# Patient Record
Sex: FEMALE | Race: WHITE | NOT HISPANIC OR LATINO | Employment: OTHER | URBAN - METROPOLITAN AREA
[De-identification: names, ages, dates, MRNs, and addresses within clinical notes are randomized per-mention and may not be internally consistent; named-entity substitution may affect disease eponyms.]

---

## 2018-01-01 ENCOUNTER — ANESTHESIA (OUTPATIENT)
Dept: PERIOP | Facility: HOSPITAL | Age: 73
End: 2018-01-01
Payer: COMMERCIAL

## 2018-01-01 ENCOUNTER — HOSPITAL ENCOUNTER (OUTPATIENT)
Dept: INFUSION CENTER | Facility: HOSPITAL | Age: 73
Discharge: HOME | End: 2018-10-04
Payer: COMMERCIAL

## 2018-01-01 ENCOUNTER — HOSPITAL ENCOUNTER (OUTPATIENT)
Dept: RADIOLOGY | Facility: HOSPITAL | Age: 73
Discharge: HOME | End: 2018-06-27
Attending: INTERNAL MEDICINE
Payer: COMMERCIAL

## 2018-01-01 ENCOUNTER — HOSPITAL ENCOUNTER (OUTPATIENT)
Dept: NON INVASIVE DIAGNOSTICS | Facility: HOSPITAL | Age: 73
Discharge: HOME | End: 2018-08-28
Attending: INTERNAL MEDICINE
Payer: COMMERCIAL

## 2018-01-01 ENCOUNTER — HOSPITAL ENCOUNTER (OUTPATIENT)
Dept: NON INVASIVE DIAGNOSTICS | Facility: HOSPITAL | Age: 73
Discharge: HOME | End: 2018-09-21
Attending: INTERNAL MEDICINE
Payer: COMMERCIAL

## 2018-01-01 ENCOUNTER — TRANSCRIBE ORDERS (OUTPATIENT)
Dept: ADMINISTRATIVE | Facility: HOSPITAL | Age: 73
End: 2018-01-01

## 2018-01-01 ENCOUNTER — HOSPITAL ENCOUNTER (OUTPATIENT)
Dept: RADIOLOGY | Facility: HOSPITAL | Age: 73
Discharge: HOME | End: 2018-07-06
Attending: OBSTETRICS & GYNECOLOGY
Payer: COMMERCIAL

## 2018-01-01 ENCOUNTER — TELEPHONE (OUTPATIENT)
Dept: HEMATOLOGY ONCOLOGY | Facility: MEDICAL CENTER | Age: 73
End: 2018-01-01

## 2018-01-01 ENCOUNTER — DOCUMENTATION (OUTPATIENT)
Dept: HEMATOLOGY ONCOLOGY | Facility: MEDICAL CENTER | Age: 73
End: 2018-01-01

## 2018-01-01 ENCOUNTER — HOSPITAL ENCOUNTER (OUTPATIENT)
Dept: NON INVASIVE DIAGNOSTICS | Facility: HOSPITAL | Age: 73
Discharge: HOME | End: 2018-07-16
Attending: INTERNAL MEDICINE
Payer: COMMERCIAL

## 2018-01-01 ENCOUNTER — HOSPITAL ENCOUNTER (OUTPATIENT)
Dept: INFUSION CENTER | Facility: HOSPITAL | Age: 73
Discharge: HOME | End: 2018-08-28
Payer: COMMERCIAL

## 2018-01-01 ENCOUNTER — TELEPHONE (OUTPATIENT)
Dept: RADIOLOGY | Facility: HOSPITAL | Age: 73
End: 2018-01-01

## 2018-01-01 ENCOUNTER — ANESTHESIA EVENT (OUTPATIENT)
Dept: GASTROENTEROLOGY | Facility: AMBULARY SURGERY CENTER | Age: 73
End: 2018-01-01
Payer: COMMERCIAL

## 2018-01-01 ENCOUNTER — DOCUMENTATION (OUTPATIENT)
Dept: INFUSION CENTER | Facility: HOSPITAL | Age: 73
End: 2018-01-01

## 2018-01-01 ENCOUNTER — ANESTHESIA EVENT (OUTPATIENT)
Dept: PERIOP | Facility: HOSPITAL | Age: 73
End: 2018-01-01
Payer: COMMERCIAL

## 2018-01-01 ENCOUNTER — OFFICE VISIT (OUTPATIENT)
Dept: CARDIOLOGY CLINIC | Facility: CLINIC | Age: 73
End: 2018-01-01
Payer: COMMERCIAL

## 2018-01-01 ENCOUNTER — TELEPHONE (OUTPATIENT)
Dept: CARDIOLOGY CLINIC | Facility: CLINIC | Age: 73
End: 2018-01-01

## 2018-01-01 ENCOUNTER — TRANSCRIBE ORDERS (OUTPATIENT)
Dept: HEMATOLOGY ONCOLOGY | Facility: MEDICAL CENTER | Age: 73
End: 2018-01-01

## 2018-01-01 ENCOUNTER — HOSPITAL ENCOUNTER (INPATIENT)
Facility: HOSPITAL | Age: 73
LOS: 1 days | DRG: 374 | End: 2018-10-10
Attending: EMERGENCY MEDICINE | Admitting: FAMILY MEDICINE
Payer: COMMERCIAL

## 2018-01-01 ENCOUNTER — HOSPITAL ENCOUNTER (OUTPATIENT)
Dept: NON INVASIVE DIAGNOSTICS | Facility: HOSPITAL | Age: 73
Discharge: HOME | End: 2018-10-04
Attending: INTERNAL MEDICINE
Payer: COMMERCIAL

## 2018-01-01 ENCOUNTER — TELEPHONE (OUTPATIENT)
Dept: NON INVASIVE DIAGNOSTICS | Facility: HOSPITAL | Age: 73
End: 2018-01-01

## 2018-01-01 ENCOUNTER — HOSPITAL ENCOUNTER (OUTPATIENT)
Dept: NON INVASIVE DIAGNOSTICS | Facility: HOSPITAL | Age: 73
Discharge: HOME | End: 2018-09-10
Attending: INTERNAL MEDICINE | Admitting: RADIOLOGY
Payer: COMMERCIAL

## 2018-01-01 ENCOUNTER — OFFICE VISIT (OUTPATIENT)
Dept: GYNECOLOGIC ONCOLOGY | Facility: CLINIC | Age: 73
End: 2018-01-01

## 2018-01-01 ENCOUNTER — HOSPITAL ENCOUNTER (OUTPATIENT)
Dept: RADIOLOGY | Facility: HOSPITAL | Age: 73
Setting detail: OUTPATIENT SURGERY
End: 2018-01-01
Payer: COMMERCIAL

## 2018-01-01 ENCOUNTER — HOSPITAL ENCOUNTER (OUTPATIENT)
Dept: RADIOLOGY | Facility: HOSPITAL | Age: 73
Discharge: HOME | End: 2018-06-25
Attending: INTERNAL MEDICINE
Payer: COMMERCIAL

## 2018-01-01 ENCOUNTER — HOSPITAL ENCOUNTER (OUTPATIENT)
Dept: NON INVASIVE DIAGNOSTICS | Facility: HOSPITAL | Age: 73
Discharge: HOME | End: 2018-09-26
Attending: INTERNAL MEDICINE | Admitting: RADIOLOGY
Payer: COMMERCIAL

## 2018-01-01 ENCOUNTER — HOSPITAL ENCOUNTER (OUTPATIENT)
Dept: INFUSION CENTER | Facility: HOSPITAL | Age: 73
Discharge: HOME | End: 2018-09-21
Payer: COMMERCIAL

## 2018-01-01 ENCOUNTER — HOSPITAL ENCOUNTER (OUTPATIENT)
Dept: RADIOLOGY | Facility: HOSPITAL | Age: 73
End: 2018-01-01
Attending: INTERNAL MEDICINE
Payer: COMMERCIAL

## 2018-01-01 ENCOUNTER — OFFICE VISIT (OUTPATIENT)
Dept: HEMATOLOGY ONCOLOGY | Facility: MEDICAL CENTER | Age: 73
End: 2018-01-01
Payer: COMMERCIAL

## 2018-01-01 ENCOUNTER — HOSPITAL ENCOUNTER (OUTPATIENT)
Facility: AMBULARY SURGERY CENTER | Age: 73
Setting detail: OUTPATIENT SURGERY
Discharge: HOME | End: 2018-06-13
Attending: INTERNAL MEDICINE | Admitting: INTERNAL MEDICINE
Payer: COMMERCIAL

## 2018-01-01 ENCOUNTER — HOSPITAL ENCOUNTER (OUTPATIENT)
Facility: HOSPITAL | Age: 73
Setting detail: OUTPATIENT SURGERY
Discharge: HOME | End: 2018-07-26
Attending: OBSTETRICS & GYNECOLOGY | Admitting: OBSTETRICS & GYNECOLOGY
Payer: COMMERCIAL

## 2018-01-01 ENCOUNTER — OFFICE VISIT (OUTPATIENT)
Dept: GYNECOLOGIC ONCOLOGY | Facility: CLINIC | Age: 73
End: 2018-01-01
Payer: COMMERCIAL

## 2018-01-01 ENCOUNTER — HOSPITAL ENCOUNTER (OUTPATIENT)
Facility: AMBULARY SURGERY CENTER | Age: 73
Setting detail: OUTPATIENT SURGERY
Discharge: HOME | End: 2018-09-05
Attending: INTERNAL MEDICINE | Admitting: INTERNAL MEDICINE
Payer: COMMERCIAL

## 2018-01-01 ENCOUNTER — APPOINTMENT (EMERGENCY)
Dept: RADIOLOGY | Facility: HOSPITAL | Age: 73
DRG: 374 | End: 2018-01-01
Payer: COMMERCIAL

## 2018-01-01 ENCOUNTER — HOSPITAL ENCOUNTER (OUTPATIENT)
Dept: RADIOLOGY | Facility: HOSPITAL | Age: 73
Discharge: HOME | End: 2018-06-04
Attending: INTERNAL MEDICINE
Payer: COMMERCIAL

## 2018-01-01 ENCOUNTER — HOSPITAL ENCOUNTER (OUTPATIENT)
Dept: RADIOLOGY | Facility: HOSPITAL | Age: 73
Discharge: HOME | End: 2018-06-25
Attending: OBSTETRICS & GYNECOLOGY
Payer: COMMERCIAL

## 2018-01-01 ENCOUNTER — TELEPHONE (OUTPATIENT)
Dept: GYNECOLOGIC ONCOLOGY | Facility: CLINIC | Age: 73
End: 2018-01-01

## 2018-01-01 ENCOUNTER — HOSPITAL ENCOUNTER (OUTPATIENT)
Dept: INFUSION CENTER | Facility: HOSPITAL | Age: 73
Discharge: HOME | End: 2018-09-10
Payer: COMMERCIAL

## 2018-01-01 ENCOUNTER — HOSPITAL ENCOUNTER (OUTPATIENT)
Dept: NON INVASIVE DIAGNOSTICS | Facility: HOSPITAL | Age: 73
Discharge: HOME | End: 2018-09-26
Attending: INTERNAL MEDICINE
Payer: COMMERCIAL

## 2018-01-01 ENCOUNTER — HOSPITAL ENCOUNTER (OUTPATIENT)
Dept: NON INVASIVE DIAGNOSTICS | Facility: HOSPITAL | Age: 73
Discharge: HOME | End: 2018-06-27
Attending: INTERNAL MEDICINE
Payer: COMMERCIAL

## 2018-01-01 ENCOUNTER — HOSPITAL ENCOUNTER (OUTPATIENT)
Dept: NON INVASIVE DIAGNOSTICS | Facility: HOSPITAL | Age: 73
End: 2018-01-01
Attending: INTERNAL MEDICINE
Payer: COMMERCIAL

## 2018-01-01 ENCOUNTER — HOSPITAL ENCOUNTER (OUTPATIENT)
Facility: HOSPITAL | Age: 73
Setting detail: OUTPATIENT SURGERY
Discharge: HOME | End: 2018-06-21
Attending: SPECIALIST | Admitting: SPECIALIST
Payer: COMMERCIAL

## 2018-01-01 ENCOUNTER — HOSPITAL ENCOUNTER (OUTPATIENT)
Dept: INFUSION CENTER | Facility: HOSPITAL | Age: 73
Discharge: HOME | End: 2018-10-03
Payer: COMMERCIAL

## 2018-01-01 ENCOUNTER — HOSPITAL ENCOUNTER (OUTPATIENT)
Facility: HOSPITAL | Age: 73
End: 2018-10-11
Attending: FAMILY MEDICINE | Admitting: FAMILY MEDICINE
Payer: COMMERCIAL

## 2018-01-01 VITALS
TEMPERATURE: 97 F | OXYGEN SATURATION: 96 % | SYSTOLIC BLOOD PRESSURE: 108 MMHG | DIASTOLIC BLOOD PRESSURE: 52 MMHG | RESPIRATION RATE: 22 BRPM | HEART RATE: 76 BPM

## 2018-01-01 VITALS
SYSTOLIC BLOOD PRESSURE: 141 MMHG | HEART RATE: 130 BPM | DIASTOLIC BLOOD PRESSURE: 73 MMHG | BODY MASS INDEX: 39.15 KG/M2 | OXYGEN SATURATION: 92 % | TEMPERATURE: 97.6 F | RESPIRATION RATE: 24 BRPM | WEIGHT: 250 LBS

## 2018-01-01 VITALS
RESPIRATION RATE: 18 BRPM | OXYGEN SATURATION: 97 % | TEMPERATURE: 98.2 F | BODY MASS INDEX: 39.87 KG/M2 | OXYGEN SATURATION: 95 % | HEART RATE: 88 BPM | TEMPERATURE: 98.6 F | SYSTOLIC BLOOD PRESSURE: 126 MMHG | WEIGHT: 254 LBS | HEART RATE: 63 BPM | BODY MASS INDEX: 36.26 KG/M2 | RESPIRATION RATE: 20 BRPM | WEIGHT: 235 LBS | SYSTOLIC BLOOD PRESSURE: 120 MMHG | DIASTOLIC BLOOD PRESSURE: 65 MMHG | DIASTOLIC BLOOD PRESSURE: 78 MMHG | HEIGHT: 67 IN

## 2018-01-01 VITALS
SYSTOLIC BLOOD PRESSURE: 128 MMHG | HEART RATE: 62 BPM | WEIGHT: 263 LBS | BODY MASS INDEX: 41.81 KG/M2 | OXYGEN SATURATION: 95 % | RESPIRATION RATE: 16 BRPM | SYSTOLIC BLOOD PRESSURE: 112 MMHG | TEMPERATURE: 97.6 F | DIASTOLIC BLOOD PRESSURE: 62 MMHG | RESPIRATION RATE: 18 BRPM | HEART RATE: 73 BPM | DIASTOLIC BLOOD PRESSURE: 57 MMHG | OXYGEN SATURATION: 98 %

## 2018-01-01 VITALS
DIASTOLIC BLOOD PRESSURE: 68 MMHG | SYSTOLIC BLOOD PRESSURE: 136 MMHG | HEART RATE: 75 BPM | TEMPERATURE: 97.1 F | RESPIRATION RATE: 18 BRPM | OXYGEN SATURATION: 95 %

## 2018-01-01 VITALS
SYSTOLIC BLOOD PRESSURE: 132 MMHG | BODY MASS INDEX: 40.63 KG/M2 | WEIGHT: 258.9 LBS | RESPIRATION RATE: 18 BRPM | HEIGHT: 67 IN | DIASTOLIC BLOOD PRESSURE: 78 MMHG | HEART RATE: 94 BPM | TEMPERATURE: 97.6 F

## 2018-01-01 VITALS
WEIGHT: 273.5 LBS | HEIGHT: 67 IN | HEART RATE: 80 BPM | DIASTOLIC BLOOD PRESSURE: 80 MMHG | TEMPERATURE: 99.3 F | BODY MASS INDEX: 42.93 KG/M2 | RESPIRATION RATE: 14 BRPM | OXYGEN SATURATION: 98 % | SYSTOLIC BLOOD PRESSURE: 132 MMHG

## 2018-01-01 VITALS
OXYGEN SATURATION: 95 % | WEIGHT: 240 LBS | BODY MASS INDEX: 37.03 KG/M2 | RESPIRATION RATE: 20 BRPM | DIASTOLIC BLOOD PRESSURE: 84 MMHG | SYSTOLIC BLOOD PRESSURE: 137 MMHG | HEART RATE: 100 BPM

## 2018-01-01 VITALS
RESPIRATION RATE: 20 BRPM | HEIGHT: 67 IN | TEMPERATURE: 98.7 F | WEIGHT: 241.3 LBS | BODY MASS INDEX: 37.87 KG/M2 | DIASTOLIC BLOOD PRESSURE: 84 MMHG | HEART RATE: 106 BPM | SYSTOLIC BLOOD PRESSURE: 140 MMHG

## 2018-01-01 VITALS
RESPIRATION RATE: 18 BRPM | HEART RATE: 86 BPM | OXYGEN SATURATION: 96 % | TEMPERATURE: 98.2 F | DIASTOLIC BLOOD PRESSURE: 63 MMHG | SYSTOLIC BLOOD PRESSURE: 129 MMHG

## 2018-01-01 VITALS
OXYGEN SATURATION: 95 % | RESPIRATION RATE: 16 BRPM | DIASTOLIC BLOOD PRESSURE: 80 MMHG | TEMPERATURE: 98.7 F | SYSTOLIC BLOOD PRESSURE: 157 MMHG | HEART RATE: 97 BPM

## 2018-01-01 VITALS
DIASTOLIC BLOOD PRESSURE: 52 MMHG | HEART RATE: 107 BPM | OXYGEN SATURATION: 99 % | TEMPERATURE: 97.9 F | RESPIRATION RATE: 20 BRPM | SYSTOLIC BLOOD PRESSURE: 137 MMHG

## 2018-01-01 VITALS
SYSTOLIC BLOOD PRESSURE: 134 MMHG | HEART RATE: 66 BPM | OXYGEN SATURATION: 97 % | RESPIRATION RATE: 16 BRPM | DIASTOLIC BLOOD PRESSURE: 68 MMHG

## 2018-01-01 VITALS
TEMPERATURE: 98.9 F | HEART RATE: 113 BPM | DIASTOLIC BLOOD PRESSURE: 89 MMHG | SYSTOLIC BLOOD PRESSURE: 165 MMHG | OXYGEN SATURATION: 93 % | RESPIRATION RATE: 18 BRPM

## 2018-01-01 VITALS
OXYGEN SATURATION: 94 % | BODY MASS INDEX: 38.19 KG/M2 | WEIGHT: 240.2 LBS | SYSTOLIC BLOOD PRESSURE: 120 MMHG | DIASTOLIC BLOOD PRESSURE: 86 MMHG | HEART RATE: 115 BPM

## 2018-01-01 VITALS
SYSTOLIC BLOOD PRESSURE: 157 MMHG | RESPIRATION RATE: 18 BRPM | DIASTOLIC BLOOD PRESSURE: 87 MMHG | OXYGEN SATURATION: 92 % | HEART RATE: 99 BPM

## 2018-01-01 VITALS
HEIGHT: 67 IN | OXYGEN SATURATION: 92 % | BODY MASS INDEX: 37.67 KG/M2 | HEART RATE: 87 BPM | RESPIRATION RATE: 16 BRPM | SYSTOLIC BLOOD PRESSURE: 100 MMHG | TEMPERATURE: 98.2 F | WEIGHT: 240 LBS | DIASTOLIC BLOOD PRESSURE: 57 MMHG

## 2018-01-01 VITALS
SYSTOLIC BLOOD PRESSURE: 107 MMHG | OXYGEN SATURATION: 95 % | TEMPERATURE: 97.8 F | HEIGHT: 67 IN | RESPIRATION RATE: 18 BRPM | HEART RATE: 95 BPM | DIASTOLIC BLOOD PRESSURE: 61 MMHG | BODY MASS INDEX: 40.55 KG/M2 | WEIGHT: 258.38 LBS

## 2018-01-01 VITALS — OXYGEN SATURATION: 94 % | HEART RATE: 88 BPM

## 2018-01-01 DIAGNOSIS — C80.1 MALIGNANT NEOPLASM (HCC): ICD-10-CM

## 2018-01-01 DIAGNOSIS — C16.9 MALIGNANT NEOPLASM OF STOMACH, UNSPECIFIED LOCATION (HCC): ICD-10-CM

## 2018-01-01 DIAGNOSIS — N94.89 ENDOMETRIAL MASS: ICD-10-CM

## 2018-01-01 DIAGNOSIS — R50.9 FEVER, UNSPECIFIED FEVER CAUSE: ICD-10-CM

## 2018-01-01 DIAGNOSIS — R97.1 ELEVATED CA-125: ICD-10-CM

## 2018-01-01 DIAGNOSIS — R18.8 OTHER ASCITES: Primary | ICD-10-CM

## 2018-01-01 DIAGNOSIS — K31.89 OTHER DISEASES OF STOMACH AND DUODENUM: ICD-10-CM

## 2018-01-01 DIAGNOSIS — F17.200 SMOKING: ICD-10-CM

## 2018-01-01 DIAGNOSIS — K55.9 ISCHEMIC BOWEL DISEASE (HCC): ICD-10-CM

## 2018-01-01 DIAGNOSIS — C78.6 PERITONEAL CARCINOMATOSIS (HCC): ICD-10-CM

## 2018-01-01 DIAGNOSIS — R18.8 OTHER ASCITES: ICD-10-CM

## 2018-01-01 DIAGNOSIS — C26.9 CANCER OF GASTROINTESTINAL TRACT (HCC): Primary | ICD-10-CM

## 2018-01-01 DIAGNOSIS — R06.02 SHORTNESS OF BREATH: ICD-10-CM

## 2018-01-01 DIAGNOSIS — Z90.79 S/P TOTAL HYSTERECTOMY AND BSO (BILATERAL SALPINGO-OOPHORECTOMY): Primary | ICD-10-CM

## 2018-01-01 DIAGNOSIS — K21.9 GASTRO-ESOPHAGEAL REFLUX DISEASE WITHOUT ESOPHAGITIS: ICD-10-CM

## 2018-01-01 DIAGNOSIS — R10.9 ABDOMINAL PAIN, UNSPECIFIED ABDOMINAL LOCATION: ICD-10-CM

## 2018-01-01 DIAGNOSIS — C78.6 PERITONEAL CARCINOMATOSIS (HCC): Primary | ICD-10-CM

## 2018-01-01 DIAGNOSIS — Z90.710 S/P TOTAL HYSTERECTOMY AND BSO (BILATERAL SALPINGO-OOPHORECTOMY): Primary | ICD-10-CM

## 2018-01-01 DIAGNOSIS — Z72.0 TOBACCO ABUSE: ICD-10-CM

## 2018-01-01 DIAGNOSIS — R10.9 ABDOMINAL PAIN: ICD-10-CM

## 2018-01-01 DIAGNOSIS — IMO0002 MASS: ICD-10-CM

## 2018-01-01 DIAGNOSIS — C80.1 ADENOCARCINOMA (HCC): ICD-10-CM

## 2018-01-01 DIAGNOSIS — R18.0 MALIGNANT ASCITES: ICD-10-CM

## 2018-01-01 DIAGNOSIS — C16.9 MALIGNANT NEOPLASM OF STOMACH, UNSPECIFIED LOCATION (HCC): Primary | ICD-10-CM

## 2018-01-01 DIAGNOSIS — C78.6 SECONDARY MALIGNANT NEOPLASM OF RETROPERITONEUM AND PERITONEUM (HCC): Primary | ICD-10-CM

## 2018-01-01 DIAGNOSIS — R10.9 ABDOMINAL PAIN, UNSPECIFIED ABDOMINAL LOCATION: Primary | ICD-10-CM

## 2018-01-01 DIAGNOSIS — N20.0 NEPHROLITHIASIS: ICD-10-CM

## 2018-01-01 DIAGNOSIS — R10.819 ABDOMINAL TENDERNESS, REBOUND TENDERNESS PRESENCE NOT SPECIFIED, UNSPECIFIED LOCATION: ICD-10-CM

## 2018-01-01 DIAGNOSIS — R94.31 ST SEGMENT DEPRESSION: ICD-10-CM

## 2018-01-01 DIAGNOSIS — N39.0 URINARY TRACT INFECTION WITHOUT HEMATURIA, SITE UNSPECIFIED: Primary | ICD-10-CM

## 2018-01-01 DIAGNOSIS — R94.31 ST SEGMENT DEPRESSION: Primary | ICD-10-CM

## 2018-01-01 DIAGNOSIS — Z01.818 PREOP EXAMINATION: Primary | ICD-10-CM

## 2018-01-01 DIAGNOSIS — R18.0 MALIGNANT ASCITES: Primary | ICD-10-CM

## 2018-01-01 DIAGNOSIS — R39.89 SENSATION OF PRESSURE IN BLADDER AREA: Primary | ICD-10-CM

## 2018-01-01 DIAGNOSIS — I87.2 CHRONIC VENOUS INSUFFICIENCY: ICD-10-CM

## 2018-01-01 DIAGNOSIS — R11.2 NAUSEA & VOMITING: ICD-10-CM

## 2018-01-01 DIAGNOSIS — Z98.890 S/P LAPAROSCOPIC PROCEDURE: ICD-10-CM

## 2018-01-01 DIAGNOSIS — R11.2 NAUSEA AND VOMITING, INTRACTABILITY OF VOMITING NOT SPECIFIED, UNSPECIFIED VOMITING TYPE: Primary | ICD-10-CM

## 2018-01-01 DIAGNOSIS — N94.89 ENDOMETRIAL MASS: Primary | ICD-10-CM

## 2018-01-01 DIAGNOSIS — I89.1 LYMPHANGITIS: ICD-10-CM

## 2018-01-01 DIAGNOSIS — R18.8 ASCITES: ICD-10-CM

## 2018-01-01 DIAGNOSIS — Z90.722 S/P TOTAL HYSTERECTOMY AND BSO (BILATERAL SALPINGO-OOPHORECTOMY): Primary | ICD-10-CM

## 2018-01-01 DIAGNOSIS — C26.9 CANCER OF GASTROINTESTINAL TRACT (HCC): ICD-10-CM

## 2018-01-01 DIAGNOSIS — C16.9 GASTRIC CANCER (HCC): Primary | ICD-10-CM

## 2018-01-01 DIAGNOSIS — N39.0 RECURRENT UTI: Primary | ICD-10-CM

## 2018-01-01 DIAGNOSIS — IMO0002 MASS: Primary | ICD-10-CM

## 2018-01-01 DIAGNOSIS — I51.9 CARDIOPATHY: ICD-10-CM

## 2018-01-01 DIAGNOSIS — R19.00 PELVIC MASS: Primary | ICD-10-CM

## 2018-01-01 LAB
ABO GROUP BLD: NORMAL
ALBUMIN SERPL BCP-MCNC: 2.6 G/DL (ref 3.5–5)
ALBUMIN SERPL BCP-MCNC: 2.9 G/DL (ref 3.5–5)
ALBUMIN SERPL-MCNC: 3.9 G/DL (ref 3.5–4.8)
ALBUMIN/GLOB SERPL: 1.4 {RATIO} (ref 1.2–2.2)
ALP SERPL-CCNC: 122 U/L (ref 46–116)
ALP SERPL-CCNC: 78 U/L (ref 46–116)
ALP SERPL-CCNC: 80 IU/L (ref 39–117)
ALT SERPL W P-5'-P-CCNC: 26 U/L (ref 12–78)
ALT SERPL W P-5'-P-CCNC: 34 U/L (ref 12–78)
ALT SERPL-CCNC: 11 IU/L (ref 0–32)
AMBIG ABBREV DEFAULT: NORMAL
ANION GAP SERPL CALCULATED.3IONS-SCNC: 10 MMOL/L (ref 4–13)
ANION GAP SERPL CALCULATED.3IONS-SCNC: 10 MMOL/L (ref 4–13)
ANION GAP SERPL CALCULATED.3IONS-SCNC: 9 MMOL/L (ref 4–13)
APPEARANCE UR: CLEAR
APTT PPP: 24 SECONDS (ref 24–36)
AST SERPL W P-5'-P-CCNC: 20 U/L (ref 5–45)
AST SERPL W P-5'-P-CCNC: 24 U/L (ref 5–45)
AST SERPL-CCNC: 10 IU/L (ref 0–40)
ATRIAL RATE: 127 BPM
ATRIAL RATE: 77 BPM
ATRIAL RATE: 88 BPM
BACTERIA UR CULT: ABNORMAL
BACTERIA UR CULT: ABNORMAL
BACTERIA URNS QL MICRO: ABNORMAL
BASOPHILS # BLD AUTO: 0 THOUSANDS/ΜL (ref 0–0.1)
BASOPHILS # BLD AUTO: 0.03 THOUSANDS/ΜL (ref 0–0.1)
BASOPHILS # BLD AUTO: 0.05 THOUSANDS/ΜL (ref 0–0.1)
BASOPHILS # BLD AUTO: 0.1 X10E3/UL (ref 0–0.2)
BASOPHILS NFR BLD AUTO: 0 % (ref 0–1)
BASOPHILS NFR BLD AUTO: 0 % (ref 0–1)
BASOPHILS NFR BLD AUTO: 1 %
BASOPHILS NFR BLD AUTO: 1 % (ref 0–1)
BILIRUB SERPL-MCNC: 0.3 MG/DL (ref 0.2–1)
BILIRUB SERPL-MCNC: 0.4 MG/DL (ref 0.2–1)
BILIRUB SERPL-MCNC: <0.2 MG/DL (ref 0–1.2)
BILIRUB UR QL STRIP: NEGATIVE
BLD GP AB SCN SERPL QL: NEGATIVE
BUN SERPL-MCNC: 11 MG/DL (ref 8–27)
BUN SERPL-MCNC: 14 MG/DL (ref 5–25)
BUN SERPL-MCNC: 21 MG/DL (ref 5–25)
BUN SERPL-MCNC: 8 MG/DL (ref 5–25)
BUN/CREAT SERPL: 12 (ref 12–28)
C DIFF TOX GENS STL QL NAA+PROBE: NORMAL
CALCIUM SERPL-MCNC: 8.4 MG/DL (ref 8.3–10.1)
CALCIUM SERPL-MCNC: 9 MG/DL (ref 8.3–10.1)
CALCIUM SERPL-MCNC: 9 MG/DL (ref 8.7–10.3)
CALCIUM SERPL-MCNC: 9.2 MG/DL (ref 8.3–10.1)
CANCER AG125 SERPL-ACNC: 33.7 U/ML (ref 0–38.1)
CEA SERPL-MCNC: 57.2 NG/ML (ref 0–4.7)
CHEST PAIN STATEMENT: NORMAL
CHLORIDE SERPL-SCNC: 100 MMOL/L (ref 100–108)
CHLORIDE SERPL-SCNC: 101 MMOL/L (ref 100–108)
CHLORIDE SERPL-SCNC: 102 MMOL/L (ref 100–108)
CHLORIDE SERPL-SCNC: 95 MMOL/L (ref 96–106)
CO2 SERPL-SCNC: 26 MMOL/L (ref 20–29)
CO2 SERPL-SCNC: 28 MMOL/L (ref 21–32)
CO2 SERPL-SCNC: 30 MMOL/L (ref 21–32)
CO2 SERPL-SCNC: 31 MMOL/L (ref 21–32)
COLOR UR: YELLOW
CREAT SERPL-MCNC: 0.84 MG/DL (ref 0.6–1.3)
CREAT SERPL-MCNC: 0.93 MG/DL (ref 0.57–1)
CREAT SERPL-MCNC: 1.23 MG/DL (ref 0.6–1.3)
CREAT SERPL-MCNC: 1.26 MG/DL (ref 0.6–1.3)
EOSINOPHIL # BLD AUTO: 0.05 THOUSAND/ΜL (ref 0–0.61)
EOSINOPHIL # BLD AUTO: 0.3 THOUSAND/ΜL (ref 0–0.61)
EOSINOPHIL # BLD AUTO: 0.34 THOUSAND/ΜL (ref 0–0.61)
EOSINOPHIL # BLD AUTO: 0.7 X10E3/UL (ref 0–0.4)
EOSINOPHIL NFR BLD AUTO: 1 % (ref 0–6)
EOSINOPHIL NFR BLD AUTO: 3 % (ref 0–6)
EOSINOPHIL NFR BLD AUTO: 4 % (ref 0–6)
EOSINOPHIL NFR BLD AUTO: 9 %
EPI CELLS #/AREA URNS HPF: ABNORMAL /HPF
ERYTHROCYTE [DISTWIDTH] IN BLOOD BY AUTOMATED COUNT: 12.8 % (ref 11.6–15.1)
ERYTHROCYTE [DISTWIDTH] IN BLOOD BY AUTOMATED COUNT: 13.7 % (ref 11.6–15.1)
ERYTHROCYTE [DISTWIDTH] IN BLOOD BY AUTOMATED COUNT: 14 % (ref 11.6–15.1)
ERYTHROCYTE [DISTWIDTH] IN BLOOD BY AUTOMATED COUNT: 14.1 % (ref 11.6–15.1)
ERYTHROCYTE [DISTWIDTH] IN BLOOD BY AUTOMATED COUNT: 14.1 % (ref 12.3–15.4)
GFR SERPL CREATININE-BSD FRML MDRD: 43 ML/MIN/1.73SQ M
GFR SERPL CREATININE-BSD FRML MDRD: 44 ML/MIN/1.73SQ M
GFR SERPL CREATININE-BSD FRML MDRD: 70 ML/MIN/1.73SQ M
GLOBULIN SER-MCNC: 2.8 G/DL (ref 1.5–4.5)
GLUCOSE P FAST SERPL-MCNC: 143 MG/DL (ref 65–99)
GLUCOSE SERPL-MCNC: 100 MG/DL (ref 65–99)
GLUCOSE SERPL-MCNC: 118 MG/DL (ref 65–140)
GLUCOSE SERPL-MCNC: 125 MG/DL (ref 65–140)
GLUCOSE SERPL-MCNC: 134 MG/DL (ref 65–140)
GLUCOSE SERPL-MCNC: 136 MG/DL (ref 65–140)
GLUCOSE SERPL-MCNC: 143 MG/DL (ref 65–140)
GLUCOSE SERPL-MCNC: 184 MG/DL (ref 65–140)
GLUCOSE SERPL-MCNC: 280 MG/DL (ref 65–140)
GLUCOSE UR QL: NEGATIVE
HBA1C MFR BLD HPLC: 5.8 %
HCT VFR BLD AUTO: 35.5 % (ref 34–46.6)
HCT VFR BLD AUTO: 35.6 % (ref 34.8–46.1)
HCT VFR BLD AUTO: 35.9 % (ref 34.8–46.1)
HCT VFR BLD AUTO: 40.5 % (ref 34.8–46.1)
HCT VFR BLD AUTO: 40.8 % (ref 34.8–46.1)
HGB BLD-MCNC: 11.2 G/DL (ref 11.5–15.4)
HGB BLD-MCNC: 11.3 G/DL (ref 11.5–15.4)
HGB BLD-MCNC: 11.9 G/DL (ref 11.1–15.9)
HGB BLD-MCNC: 12.8 G/DL (ref 11.5–15.4)
HGB BLD-MCNC: 13.2 G/DL (ref 11.5–15.4)
HGB UR QL STRIP: ABNORMAL
IMM GRANULOCYTES # BLD AUTO: 0.02 THOUSAND/UL (ref 0–0.2)
IMM GRANULOCYTES # BLD AUTO: 0.04 THOUSAND/UL (ref 0–0.2)
IMM GRANULOCYTES # BLD AUTO: 0.07 THOUSAND/UL (ref 0–0.2)
IMM GRANULOCYTES # BLD: 0 X10E3/UL (ref 0–0.1)
IMM GRANULOCYTES NFR BLD AUTO: 0 % (ref 0–2)
IMM GRANULOCYTES NFR BLD AUTO: 1 % (ref 0–2)
IMM GRANULOCYTES NFR BLD AUTO: 1 % (ref 0–2)
IMM GRANULOCYTES NFR BLD: 0 %
INR PPP: 1.01 (ref 0.86–1.16)
INR PPP: 1.01 (ref 0.86–1.16)
KETONES UR QL STRIP: NEGATIVE
LACTATE SERPL-SCNC: 2.6 MMOL/L (ref 0.5–2)
LEUKOCYTE ESTERASE UR QL STRIP: ABNORMAL
LYMPHOCYTES # BLD AUTO: 1.08 THOUSANDS/ΜL (ref 0.6–4.47)
LYMPHOCYTES # BLD AUTO: 1.58 THOUSANDS/ΜL (ref 0.6–4.47)
LYMPHOCYTES # BLD AUTO: 1.75 THOUSANDS/ΜL (ref 0.6–4.47)
LYMPHOCYTES # BLD AUTO: 1.8 X10E3/UL (ref 0.7–3.1)
LYMPHOCYTES NFR BLD AUTO: 12 % (ref 14–44)
LYMPHOCYTES NFR BLD AUTO: 21 % (ref 14–44)
LYMPHOCYTES NFR BLD AUTO: 24 %
LYMPHOCYTES NFR BLD AUTO: 32 % (ref 14–44)
MAGNESIUM SERPL-MCNC: 1.7 MG/DL (ref 1.6–2.6)
MAX DIASTOLIC BP: 56 MMHG
MAX HEART RATE: 95 BPM
MAX PREDICTED HEART RATE: 148 BPM
MAX. SYSTOLIC BP: 153 MMHG
MCH RBC QN AUTO: 30.3 PG (ref 26.8–34.3)
MCH RBC QN AUTO: 30.3 PG (ref 26.8–34.3)
MCH RBC QN AUTO: 30.4 PG (ref 26.8–34.3)
MCH RBC QN AUTO: 30.6 PG (ref 26.6–33)
MCH RBC QN AUTO: 31.9 PG (ref 26.8–34.3)
MCHC RBC AUTO-ENTMCNC: 31.4 G/DL (ref 31.4–37.4)
MCHC RBC AUTO-ENTMCNC: 31.5 G/DL (ref 31.4–37.4)
MCHC RBC AUTO-ENTMCNC: 31.5 G/DL (ref 31.4–37.4)
MCHC RBC AUTO-ENTMCNC: 32.6 G/DL (ref 31.4–37.4)
MCHC RBC AUTO-ENTMCNC: 33.5 G/DL (ref 31.5–35.7)
MCV RBC AUTO: 91 FL (ref 79–97)
MCV RBC AUTO: 96 FL (ref 82–98)
MCV RBC AUTO: 97 FL (ref 82–98)
MCV RBC AUTO: 97 FL (ref 82–98)
MCV RBC AUTO: 98 FL (ref 82–98)
MICRO URNS: ABNORMAL
MONOCYTES # BLD AUTO: 0.46 THOUSAND/ΜL (ref 0.17–1.22)
MONOCYTES # BLD AUTO: 0.74 THOUSAND/ΜL (ref 0.17–1.22)
MONOCYTES # BLD AUTO: 0.84 THOUSAND/ΜL (ref 0.17–1.22)
MONOCYTES # BLD AUTO: 0.9 X10E3/UL (ref 0.1–0.9)
MONOCYTES NFR BLD AUTO: 10 % (ref 4–12)
MONOCYTES NFR BLD AUTO: 12 %
MONOCYTES NFR BLD AUTO: 8 % (ref 4–12)
MONOCYTES NFR BLD AUTO: 9 % (ref 4–12)
NEUTROPHILS # BLD AUTO: 2.87 THOUSANDS/ΜL (ref 1.85–7.62)
NEUTROPHILS # BLD AUTO: 4.2 X10E3/UL (ref 1.4–7)
NEUTROPHILS # BLD AUTO: 5.18 THOUSANDS/ΜL (ref 1.85–7.62)
NEUTROPHILS # BLD AUTO: 6.55 THOUSANDS/ΜL (ref 1.85–7.62)
NEUTROPHILS NFR BLD AUTO: 54 %
NEUTS SEG NFR BLD AUTO: 58 % (ref 43–75)
NEUTS SEG NFR BLD AUTO: 63 % (ref 43–75)
NEUTS SEG NFR BLD AUTO: 76 % (ref 43–75)
NITRITE UR QL STRIP: NEGATIVE
NRBC BLD AUTO-RTO: 0 /100 WBCS
OVERALL HR RESPONSE TO EXERCISE: NORMAL
P AXIS: 59 DEGREES
P AXIS: 71 DEGREES
P AXIS: 74 DEGREES
PH UR STRIP: 6.5 [PH] (ref 5–7.5)
PLATELET # BLD AUTO: 273 THOUSANDS/UL (ref 149–390)
PLATELET # BLD AUTO: 275 THOUSANDS/UL (ref 149–390)
PLATELET # BLD AUTO: 276 THOUSANDS/UL (ref 149–390)
PLATELET # BLD AUTO: 286 X10E3/UL (ref 150–379)
PLATELET # BLD AUTO: 348 THOUSANDS/UL (ref 149–390)
PMV BLD AUTO: 8.6 FL (ref 8.9–12.7)
PMV BLD AUTO: 8.8 FL (ref 8.9–12.7)
PMV BLD AUTO: 8.8 FL (ref 8.9–12.7)
PMV BLD AUTO: 9.3 FL (ref 8.9–12.7)
POTASSIUM SERPL-SCNC: 2.9 MMOL/L (ref 3.5–5.3)
POTASSIUM SERPL-SCNC: 3.4 MMOL/L (ref 3.5–5.3)
POTASSIUM SERPL-SCNC: 4 MMOL/L (ref 3.5–5.2)
POTASSIUM SERPL-SCNC: 4.1 MMOL/L (ref 3.5–5.3)
PR INTERVAL: 176 MS
PR INTERVAL: 194 MS
PROT SERPL-MCNC: 6.2 G/DL (ref 6.4–8.2)
PROT SERPL-MCNC: 6.7 G/DL (ref 6.4–8.2)
PROT SERPL-MCNC: 6.7 G/DL (ref 6–8.5)
PROT UR QL STRIP: NEGATIVE
PROTHROMBIN TIME: 10.6 SECONDS (ref 9.4–11.7)
PROTHROMBIN TIME: 10.6 SECONDS (ref 9.4–11.7)
PROTOCOL NAME: NORMAL
QRS AXIS: -11 DEGREES
QRS AXIS: -13 DEGREES
QRS AXIS: 42 DEGREES
QRSD INTERVAL: 78 MS
QRSD INTERVAL: 80 MS
QRSD INTERVAL: 82 MS
QT INTERVAL: 366 MS
QT INTERVAL: 372 MS
QT INTERVAL: 400 MS
QTC INTERVAL: 450 MS
QTC INTERVAL: 452 MS
QTC INTERVAL: 531 MS
RBC # BLD AUTO: 3.7 MILLION/UL (ref 3.81–5.12)
RBC # BLD AUTO: 3.72 MILLION/UL (ref 3.81–5.12)
RBC # BLD AUTO: 3.89 X10E6/UL (ref 3.77–5.28)
RBC # BLD AUTO: 4.14 MILLION/UL (ref 3.81–5.12)
RBC # BLD AUTO: 4.23 MILLION/UL (ref 3.81–5.12)
RBC #/AREA URNS HPF: ABNORMAL /HPF
REASON FOR TERMINATION: NORMAL
RH BLD: POSITIVE
SCAN RESULT: NORMAL
SL AMB EGFR AFRICAN AMERICAN: 71 ML/MIN/1.73
SL AMB EGFR NON AFRICAN AMERICAN: 62 ML/MIN/1.73
SODIUM SERPL-SCNC: 138 MMOL/L (ref 136–145)
SODIUM SERPL-SCNC: 139 MMOL/L (ref 134–144)
SODIUM SERPL-SCNC: 140 MMOL/L (ref 136–145)
SODIUM SERPL-SCNC: 143 MMOL/L (ref 136–145)
SP GR UR: 1.01 (ref 1–1.03)
SPECIMEN EXPIRATION DATE: NORMAL
T WAVE AXIS: 29 DEGREES
T WAVE AXIS: 56 DEGREES
T WAVE AXIS: 69 DEGREES
TARGET HR FORMULA: NORMAL
TEST INDICATION: NORMAL
TIME IN EXERCISE PHASE: NORMAL
TROPONIN I SERPL-MCNC: <0.02 NG/ML
TROPONIN I SERPL-MCNC: <0.02 NG/ML
UROBILINOGEN UR STRIP-ACNC: 0.2 EU/DL (ref 0.2–1)
VENTRICULAR RATE: 127 BPM
VENTRICULAR RATE: 77 BPM
VENTRICULAR RATE: 88 BPM
WBC # BLD AUTO: 4.98 THOUSAND/UL (ref 4.31–10.16)
WBC # BLD AUTO: 7.7 THOUSAND/UL (ref 4.31–10.16)
WBC # BLD AUTO: 7.7 X10E3/UL (ref 3.4–10.8)
WBC # BLD AUTO: 8.2 THOUSAND/UL (ref 4.31–10.16)
WBC # BLD AUTO: 8.77 THOUSAND/UL (ref 4.31–10.16)
WBC #/AREA URNS HPF: ABNORMAL /HPF

## 2018-01-01 PROCEDURE — 78452 HT MUSCLE IMAGE SPECT MULT: CPT

## 2018-01-01 PROCEDURE — 88305 TISSUE EXAM BY PATHOLOGIST: CPT | Performed by: PATHOLOGY

## 2018-01-01 PROCEDURE — 49083 ABD PARACENTESIS W/IMAGING: CPT | Performed by: RADIOLOGY

## 2018-01-01 PROCEDURE — 85610 PROTHROMBIN TIME: CPT | Performed by: RADIOLOGY

## 2018-01-01 PROCEDURE — 99024 POSTOP FOLLOW-UP VISIT: CPT | Performed by: OBSTETRICS & GYNECOLOGY

## 2018-01-01 PROCEDURE — 77001 FLUOROGUIDE FOR VEIN DEVICE: CPT | Performed by: RADIOLOGY

## 2018-01-01 PROCEDURE — 99205 OFFICE O/P NEW HI 60 MIN: CPT | Performed by: INTERNAL MEDICINE

## 2018-01-01 PROCEDURE — 87493 C DIFF AMPLIFIED PROBE: CPT | Performed by: EMERGENCY MEDICINE

## 2018-01-01 PROCEDURE — 76856 US EXAM PELVIC COMPLETE: CPT

## 2018-01-01 PROCEDURE — 93010 ELECTROCARDIOGRAM REPORT: CPT | Performed by: INTERNAL MEDICINE

## 2018-01-01 PROCEDURE — C1788 PORT, INDWELLING, IMP: HCPCS

## 2018-01-01 PROCEDURE — 88313 SPECIAL STAINS GROUP 2: CPT | Performed by: PATHOLOGY

## 2018-01-01 PROCEDURE — 87086 URINE CULTURE/COLONY COUNT: CPT | Performed by: OBSTETRICS & GYNECOLOGY

## 2018-01-01 PROCEDURE — 86900 BLOOD TYPING SEROLOGIC ABO: CPT | Performed by: OBSTETRICS & GYNECOLOGY

## 2018-01-01 PROCEDURE — 96361 HYDRATE IV INFUSION ADD-ON: CPT

## 2018-01-01 PROCEDURE — 77001 FLUOROGUIDE FOR VEIN DEVICE: CPT

## 2018-01-01 PROCEDURE — 93306 TTE W/DOPPLER COMPLETE: CPT

## 2018-01-01 PROCEDURE — 49083 ABD PARACENTESIS W/IMAGING: CPT

## 2018-01-01 PROCEDURE — 88341 IMHCHEM/IMCYTCHM EA ADD ANTB: CPT | Performed by: PATHOLOGY

## 2018-01-01 PROCEDURE — 85610 PROTHROMBIN TIME: CPT

## 2018-01-01 PROCEDURE — 76830 TRANSVAGINAL US NON-OB: CPT

## 2018-01-01 PROCEDURE — 74176 CT ABD & PELVIS W/O CONTRAST: CPT

## 2018-01-01 PROCEDURE — 83605 ASSAY OF LACTIC ACID: CPT

## 2018-01-01 PROCEDURE — 96367 TX/PROPH/DG ADDL SEQ IV INF: CPT

## 2018-01-01 PROCEDURE — 71046 X-RAY EXAM CHEST 2 VIEWS: CPT

## 2018-01-01 PROCEDURE — 88342 IMHCHEM/IMCYTCHM 1ST ANTB: CPT | Performed by: PATHOLOGY

## 2018-01-01 PROCEDURE — 76937 US GUIDE VASCULAR ACCESS: CPT

## 2018-01-01 PROCEDURE — 80053 COMPREHEN METABOLIC PANEL: CPT | Performed by: INTERNAL MEDICINE

## 2018-01-01 PROCEDURE — 76937 US GUIDE VASCULAR ACCESS: CPT | Performed by: RADIOLOGY

## 2018-01-01 PROCEDURE — 99285 EMERGENCY DEPT VISIT HI MDM: CPT

## 2018-01-01 PROCEDURE — 99152 MOD SED SAME PHYS/QHP 5/>YRS: CPT | Performed by: RADIOLOGY

## 2018-01-01 PROCEDURE — 71250 CT THORAX DX C-: CPT

## 2018-01-01 PROCEDURE — 85730 THROMBOPLASTIN TIME PARTIAL: CPT

## 2018-01-01 PROCEDURE — 36415 COLL VENOUS BLD VENIPUNCTURE: CPT

## 2018-01-01 PROCEDURE — 36561 INSERT TUNNELED CV CATH: CPT

## 2018-01-01 PROCEDURE — 96413 CHEMO IV INFUSION 1 HR: CPT

## 2018-01-01 PROCEDURE — 84484 ASSAY OF TROPONIN QUANT: CPT

## 2018-01-01 PROCEDURE — 82948 REAGENT STRIP/BLOOD GLUCOSE: CPT

## 2018-01-01 PROCEDURE — 85025 COMPLETE CBC W/AUTO DIFF WBC: CPT

## 2018-01-01 PROCEDURE — 99214 OFFICE O/P EST MOD 30 MIN: CPT | Performed by: INTERNAL MEDICINE

## 2018-01-01 PROCEDURE — 99407 BEHAV CHNG SMOKING > 10 MIN: CPT | Performed by: INTERNAL MEDICINE

## 2018-01-01 PROCEDURE — 80048 BASIC METABOLIC PNL TOTAL CA: CPT | Performed by: ANESTHESIOLOGY

## 2018-01-01 PROCEDURE — 93005 ELECTROCARDIOGRAM TRACING: CPT

## 2018-01-01 PROCEDURE — 49321 LAPAROSCOPY BIOPSY: CPT | Performed by: OBSTETRICS & GYNECOLOGY

## 2018-01-01 PROCEDURE — 84484 ASSAY OF TROPONIN QUANT: CPT | Performed by: ANESTHESIOLOGY

## 2018-01-01 PROCEDURE — 80053 COMPREHEN METABOLIC PANEL: CPT

## 2018-01-01 PROCEDURE — 58100 BIOPSY OF UTERUS LINING: CPT | Performed by: OBSTETRICS & GYNECOLOGY

## 2018-01-01 PROCEDURE — 83735 ASSAY OF MAGNESIUM: CPT | Performed by: ANESTHESIOLOGY

## 2018-01-01 PROCEDURE — 86850 RBC ANTIBODY SCREEN: CPT | Performed by: OBSTETRICS & GYNECOLOGY

## 2018-01-01 PROCEDURE — 85025 COMPLETE CBC W/AUTO DIFF WBC: CPT | Performed by: ANESTHESIOLOGY

## 2018-01-01 PROCEDURE — 93306 TTE W/DOPPLER COMPLETE: CPT | Performed by: INTERNAL MEDICINE

## 2018-01-01 PROCEDURE — 96411 CHEMO IV PUSH ADDL DRUG: CPT

## 2018-01-01 PROCEDURE — 93000 ELECTROCARDIOGRAM COMPLETE: CPT | Performed by: INTERNAL MEDICINE

## 2018-01-01 PROCEDURE — 87077 CULTURE AEROBIC IDENTIFY: CPT | Performed by: OBSTETRICS & GYNECOLOGY

## 2018-01-01 PROCEDURE — 99205 OFFICE O/P NEW HI 60 MIN: CPT | Performed by: OBSTETRICS & GYNECOLOGY

## 2018-01-01 PROCEDURE — 99238 HOSP IP/OBS DSCHRG MGMT 30/<: CPT | Performed by: FAMILY MEDICINE

## 2018-01-01 PROCEDURE — 96415 CHEMO IV INFUSION ADDL HR: CPT

## 2018-01-01 PROCEDURE — 85027 COMPLETE CBC AUTOMATED: CPT | Performed by: RADIOLOGY

## 2018-01-01 PROCEDURE — 88112 CYTOPATH CELL ENHANCE TECH: CPT | Performed by: PATHOLOGY

## 2018-01-01 PROCEDURE — 87186 SC STD MICRODIL/AGAR DIL: CPT | Performed by: OBSTETRICS & GYNECOLOGY

## 2018-01-01 PROCEDURE — 96368 THER/DIAG CONCURRENT INF: CPT

## 2018-01-01 PROCEDURE — 86901 BLOOD TYPING SEROLOGIC RH(D): CPT | Performed by: OBSTETRICS & GYNECOLOGY

## 2018-01-01 PROCEDURE — 99215 OFFICE O/P EST HI 40 MIN: CPT | Performed by: INTERNAL MEDICINE

## 2018-01-01 PROCEDURE — 99223 1ST HOSP IP/OBS HIGH 75: CPT | Performed by: FAMILY MEDICINE

## 2018-01-01 PROCEDURE — A9502 TC99M TETROFOSMIN: HCPCS

## 2018-01-01 PROCEDURE — 85025 COMPLETE CBC W/AUTO DIFF WBC: CPT | Performed by: INTERNAL MEDICINE

## 2018-01-01 PROCEDURE — 93017 CV STRESS TEST TRACING ONLY: CPT

## 2018-01-01 PROCEDURE — 74177 CT ABD & PELVIS W/CONTRAST: CPT

## 2018-01-01 PROCEDURE — 36569 INSJ PICC 5 YR+ W/O IMAGING: CPT | Performed by: RADIOLOGY

## 2018-01-01 PROCEDURE — 96374 THER/PROPH/DIAG INJ IV PUSH: CPT

## 2018-01-01 RX ORDER — HYDROMORPHONE HCL/PF 1 MG/ML
1 SYRINGE (ML) INJECTION
Status: CANCELLED | OUTPATIENT
Start: 2018-01-01

## 2018-01-01 RX ORDER — FENTANYL CITRATE 50 UG/ML
INJECTION, SOLUTION INTRAMUSCULAR; INTRAVENOUS CODE/TRAUMA/SEDATION MEDICATION
Status: COMPLETED | OUTPATIENT
Start: 2018-01-01 | End: 2018-01-01

## 2018-01-01 RX ORDER — PROPOFOL 10 MG/ML
INJECTION, EMULSION INTRAVENOUS CONTINUOUS PRN
Status: DISCONTINUED | OUTPATIENT
Start: 2018-01-01 | End: 2018-01-01 | Stop reason: SURG

## 2018-01-01 RX ORDER — HYDROMORPHONE HCL/PF 1 MG/ML
1 SYRINGE (ML) INJECTION ONCE
Status: COMPLETED | OUTPATIENT
Start: 2018-01-01 | End: 2018-01-01

## 2018-01-01 RX ORDER — ONDANSETRON 2 MG/ML
4 INJECTION INTRAMUSCULAR; INTRAVENOUS EVERY 6 HOURS PRN
Status: DISCONTINUED | OUTPATIENT
Start: 2018-01-01 | End: 2018-01-01 | Stop reason: HOSPADM

## 2018-01-01 RX ORDER — DIPHENOXYLATE HYDROCHLORIDE AND ATROPINE SULFATE 2.5; .025 MG/1; MG/1
1 TABLET ORAL ONCE
Status: DISCONTINUED | OUTPATIENT
Start: 2018-01-01 | End: 2018-01-01

## 2018-01-01 RX ORDER — LEVOFLOXACIN 750 MG/1
750 TABLET ORAL EVERY 24 HOURS
Qty: 5 TABLET | Refills: 0 | Status: SHIPPED | OUTPATIENT
Start: 2018-01-01 | End: 2018-01-01

## 2018-01-01 RX ORDER — ALBUMIN (HUMAN) 12.5 G/50ML
12.5 SOLUTION INTRAVENOUS ONCE
Status: COMPLETED | OUTPATIENT
Start: 2018-01-01 | End: 2018-01-01

## 2018-01-01 RX ORDER — PREGABALIN 100 MG/1
200 CAPSULE ORAL ONCE
Status: COMPLETED | OUTPATIENT
Start: 2018-01-01 | End: 2018-01-01

## 2018-01-01 RX ORDER — ACETAMINOPHEN 325 MG/1
650 TABLET ORAL EVERY 6 HOURS PRN
Qty: 30 TABLET | Refills: 0
Start: 2018-01-01 | End: 2018-01-01

## 2018-01-01 RX ORDER — LORAZEPAM 2 MG/ML
0.5 INJECTION INTRAMUSCULAR EVERY 2 HOUR PRN
Status: DISCONTINUED | OUTPATIENT
Start: 2018-01-01 | End: 2018-01-01 | Stop reason: HOSPADM

## 2018-01-01 RX ORDER — HYDROMORPHONE HCL/PF 1 MG/ML
1 SYRINGE (ML) INJECTION
Status: DISCONTINUED | OUTPATIENT
Start: 2018-01-01 | End: 2018-01-01 | Stop reason: HOSPADM

## 2018-01-01 RX ORDER — FUROSEMIDE 80 MG
80 TABLET ORAL EVERY MORNING
COMMUNITY
End: 2018-01-01 | Stop reason: HOSPADM

## 2018-01-01 RX ORDER — ALBUMIN (HUMAN) 12.5 G/50ML
37.5 SOLUTION INTRAVENOUS ONCE
Status: DISCONTINUED | OUTPATIENT
Start: 2018-01-01 | End: 2018-01-01 | Stop reason: SDUPTHER

## 2018-01-01 RX ORDER — LIDOCAINE HYDROCHLORIDE 10 MG/ML
INJECTION, SOLUTION INFILTRATION; PERINEURAL CODE/TRAUMA/SEDATION MEDICATION
Status: COMPLETED | OUTPATIENT
Start: 2018-01-01 | End: 2018-01-01

## 2018-01-01 RX ORDER — ALBUMIN (HUMAN) 12.5 G/50ML
25 SOLUTION INTRAVENOUS ONCE
Status: COMPLETED | OUTPATIENT
Start: 2018-01-01 | End: 2018-01-01

## 2018-01-01 RX ORDER — ACETAMINOPHEN 325 MG/1
975 TABLET ORAL ONCE
Status: COMPLETED | OUTPATIENT
Start: 2018-01-01 | End: 2018-01-01

## 2018-01-01 RX ORDER — ACETAMINOPHEN 325 MG/1
650 TABLET ORAL EVERY 6 HOURS PRN
Status: DISCONTINUED | OUTPATIENT
Start: 2018-01-01 | End: 2018-01-01 | Stop reason: HOSPADM

## 2018-01-01 RX ORDER — SODIUM CHLORIDE, SODIUM LACTATE, POTASSIUM CHLORIDE, CALCIUM CHLORIDE 600; 310; 30; 20 MG/100ML; MG/100ML; MG/100ML; MG/100ML
125 INJECTION, SOLUTION INTRAVENOUS CONTINUOUS
Status: DISCONTINUED | OUTPATIENT
Start: 2018-01-01 | End: 2018-01-01 | Stop reason: HOSPADM

## 2018-01-01 RX ORDER — LOSARTAN POTASSIUM 100 MG/1
100 TABLET ORAL EVERY MORNING
COMMUNITY
End: 2018-01-01 | Stop reason: HOSPADM

## 2018-01-01 RX ORDER — PROPOFOL 10 MG/ML
INJECTION, EMULSION INTRAVENOUS AS NEEDED
Status: DISCONTINUED | OUTPATIENT
Start: 2018-01-01 | End: 2018-01-01 | Stop reason: SURG

## 2018-01-01 RX ORDER — ONDANSETRON 2 MG/ML
4 INJECTION INTRAMUSCULAR; INTRAVENOUS ONCE
Status: COMPLETED | OUTPATIENT
Start: 2018-01-01 | End: 2018-01-01

## 2018-01-01 RX ORDER — CIPROFLOXACIN 750 MG/1
TABLET, FILM COATED ORAL
Refills: 0 | COMMUNITY
Start: 2018-01-01 | End: 2018-01-01 | Stop reason: ALTCHOICE

## 2018-01-01 RX ORDER — SODIUM CHLORIDE, SODIUM LACTATE, POTASSIUM CHLORIDE, CALCIUM CHLORIDE 600; 310; 30; 20 MG/100ML; MG/100ML; MG/100ML; MG/100ML
75 INJECTION, SOLUTION INTRAVENOUS CONTINUOUS
Status: DISCONTINUED | OUTPATIENT
Start: 2018-01-01 | End: 2018-01-01 | Stop reason: HOSPADM

## 2018-01-01 RX ORDER — OXYCODONE HYDROCHLORIDE AND ACETAMINOPHEN 5; 325 MG/1; MG/1
2 TABLET ORAL EVERY 4 HOURS PRN
Status: DISCONTINUED | OUTPATIENT
Start: 2018-01-01 | End: 2018-01-01 | Stop reason: HOSPADM

## 2018-01-01 RX ORDER — OXYCODONE HYDROCHLORIDE AND ACETAMINOPHEN 5; 325 MG/1; MG/1
1 TABLET ORAL EVERY 6 HOURS PRN
Qty: 40 TABLET | Refills: 0 | Status: SHIPPED | OUTPATIENT
Start: 2018-01-01 | End: 2018-01-01 | Stop reason: HOSPADM

## 2018-01-01 RX ORDER — ONDANSETRON HYDROCHLORIDE 8 MG/1
8 TABLET, FILM COATED ORAL EVERY 8 HOURS PRN
Qty: 20 TABLET | Refills: 0 | Status: SHIPPED | OUTPATIENT
Start: 2018-01-01 | End: 2018-01-01 | Stop reason: HOSPADM

## 2018-01-01 RX ORDER — AMLODIPINE BESYLATE 5 MG/1
5 TABLET ORAL EVERY MORNING
COMMUNITY
End: 2018-01-01 | Stop reason: HOSPADM

## 2018-01-01 RX ORDER — FLUOROURACIL 50 MG/ML
672 INJECTION, SOLUTION INTRAVENOUS ONCE
Status: COMPLETED | OUTPATIENT
Start: 2018-01-01 | End: 2018-01-01

## 2018-01-01 RX ORDER — HYDROMORPHONE HCL 110MG/55ML
2 PATIENT CONTROLLED ANALGESIA SYRINGE INTRAVENOUS
Status: DISCONTINUED | OUTPATIENT
Start: 2018-01-01 | End: 2018-01-01 | Stop reason: HOSPADM

## 2018-01-01 RX ORDER — DEXTROSE MONOHYDRATE 50 MG/ML
20 INJECTION, SOLUTION INTRAVENOUS ONCE
Status: COMPLETED | OUTPATIENT
Start: 2018-01-01 | End: 2018-01-01

## 2018-01-01 RX ORDER — BUPIVACAINE HYDROCHLORIDE 2.5 MG/ML
INJECTION, SOLUTION EPIDURAL; INFILTRATION; INTRACAUDAL AS NEEDED
Status: DISCONTINUED | OUTPATIENT
Start: 2018-01-01 | End: 2018-01-01 | Stop reason: HOSPADM

## 2018-01-01 RX ORDER — ONDANSETRON 2 MG/ML
4 INJECTION INTRAMUSCULAR; INTRAVENOUS ONCE AS NEEDED
Status: DISCONTINUED | OUTPATIENT
Start: 2018-01-01 | End: 2018-01-01 | Stop reason: HOSPADM

## 2018-01-01 RX ORDER — GLYCOPYRROLATE 0.2 MG/ML
0.2 INJECTION INTRAMUSCULAR; INTRAVENOUS EVERY 4 HOURS PRN
Status: DISCONTINUED | OUTPATIENT
Start: 2018-01-01 | End: 2018-01-01 | Stop reason: HOSPADM

## 2018-01-01 RX ORDER — SODIUM CHLORIDE 9 MG/ML
INJECTION, SOLUTION INTRAVENOUS
Status: COMPLETED | OUTPATIENT
Start: 2018-01-01 | End: 2018-01-01

## 2018-01-01 RX ORDER — OXYCODONE HYDROCHLORIDE AND ACETAMINOPHEN 5; 325 MG/1; MG/1
1-2 TABLET ORAL EVERY 4 HOURS PRN
Qty: 20 TABLET | Refills: 0 | Status: SHIPPED | OUTPATIENT
Start: 2018-01-01 | End: 2018-01-01 | Stop reason: HOSPADM

## 2018-01-01 RX ORDER — SODIUM CHLORIDE 9 MG/ML
50 INJECTION, SOLUTION INTRAVENOUS CONTINUOUS
Status: CANCELLED | OUTPATIENT
Start: 2018-01-01

## 2018-01-01 RX ORDER — SODIUM CHLORIDE 9 MG/ML
50 INJECTION, SOLUTION INTRAVENOUS CONTINUOUS
Status: DISCONTINUED | OUTPATIENT
Start: 2018-01-01 | End: 2018-01-01 | Stop reason: HOSPADM

## 2018-01-01 RX ORDER — PREGABALIN 200 MG/1
200 CAPSULE ORAL 2 TIMES DAILY
COMMUNITY
End: 2018-01-01 | Stop reason: HOSPADM

## 2018-01-01 RX ORDER — LORAZEPAM 2 MG/ML
0.5 INJECTION INTRAMUSCULAR EVERY 2 HOUR PRN
Status: CANCELLED | OUTPATIENT
Start: 2018-01-01

## 2018-01-01 RX ORDER — CETIRIZINE HYDROCHLORIDE 10 MG/1
10 TABLET ORAL
COMMUNITY
End: 2018-01-01 | Stop reason: HOSPADM

## 2018-01-01 RX ORDER — DIPHENOXYLATE HYDROCHLORIDE AND ATROPINE SULFATE 2.5; .025 MG/1; MG/1
2 TABLET ORAL ONCE
Status: COMPLETED | OUTPATIENT
Start: 2018-01-01 | End: 2018-01-01

## 2018-01-01 RX ORDER — FENTANYL CITRATE 50 UG/ML
INJECTION, SOLUTION INTRAMUSCULAR; INTRAVENOUS AS NEEDED
Status: DISCONTINUED | OUTPATIENT
Start: 2018-01-01 | End: 2018-01-01 | Stop reason: SURG

## 2018-01-01 RX ORDER — SODIUM CHLORIDE 9 MG/ML
75 INJECTION, SOLUTION INTRAVENOUS CONTINUOUS
Status: DISCONTINUED | OUTPATIENT
Start: 2018-01-01 | End: 2018-01-01 | Stop reason: HOSPADM

## 2018-01-01 RX ORDER — OXYCODONE HYDROCHLORIDE AND ACETAMINOPHEN 5; 325 MG/1; MG/1
1 TABLET ORAL EVERY 4 HOURS PRN
Status: DISCONTINUED | OUTPATIENT
Start: 2018-01-01 | End: 2018-01-01 | Stop reason: HOSPADM

## 2018-01-01 RX ORDER — IBUPROFEN 600 MG/1
600 TABLET ORAL EVERY 6 HOURS PRN
Qty: 30 TABLET | Refills: 0
Start: 2018-01-01 | End: 2018-01-01

## 2018-01-01 RX ORDER — ALBUMIN (HUMAN) 12.5 G/50ML
50 SOLUTION INTRAVENOUS ONCE
Status: COMPLETED | OUTPATIENT
Start: 2018-01-01 | End: 2018-01-01

## 2018-01-01 RX ORDER — ACETAMINOPHEN 325 MG/1
650 TABLET ORAL EVERY 6 HOURS PRN
Status: CANCELLED | OUTPATIENT
Start: 2018-01-01

## 2018-01-01 RX ORDER — CALCIUM CARBONATE/VITAMIN D3 600MG-62.5
CAPSULE ORAL AS NEEDED
COMMUNITY
End: 2018-01-01

## 2018-01-01 RX ORDER — HYDROMORPHONE HCL 110MG/55ML
2 PATIENT CONTROLLED ANALGESIA SYRINGE INTRAVENOUS
Status: CANCELLED | OUTPATIENT
Start: 2018-01-01

## 2018-01-01 RX ORDER — POTASSIUM CHLORIDE 20 MEQ/1
TABLET, EXTENDED RELEASE ORAL
Refills: 3 | COMMUNITY
Start: 2018-01-01 | End: 2018-01-01 | Stop reason: HOSPADM

## 2018-01-01 RX ORDER — HYDROMORPHONE HCL 110MG/55ML
2 PATIENT CONTROLLED ANALGESIA SYRINGE INTRAVENOUS ONCE
Status: COMPLETED | OUTPATIENT
Start: 2018-01-01 | End: 2018-01-01

## 2018-01-01 RX ORDER — FLUOROURACIL 50 MG/ML
806 INJECTION, SOLUTION INTRAVENOUS ONCE
Status: DISCONTINUED | OUTPATIENT
Start: 2018-01-01 | End: 2018-01-01 | Stop reason: SDUPTHER

## 2018-01-01 RX ORDER — GLYCOPYRROLATE 0.2 MG/ML
0.2 INJECTION INTRAMUSCULAR; INTRAVENOUS EVERY 4 HOURS PRN
Status: CANCELLED | OUTPATIENT
Start: 2018-01-01

## 2018-01-01 RX ORDER — HYDROMORPHONE HCL/PF 1 MG/ML
1 SYRINGE (ML) INJECTION
Status: DISCONTINUED | OUTPATIENT
Start: 2018-01-01 | End: 2018-01-01

## 2018-01-01 RX ORDER — ROCURONIUM BROMIDE 10 MG/ML
INJECTION, SOLUTION INTRAVENOUS AS NEEDED
Status: DISCONTINUED | OUTPATIENT
Start: 2018-01-01 | End: 2018-01-01 | Stop reason: SURG

## 2018-01-01 RX ORDER — ONDANSETRON 2 MG/ML
4 INJECTION INTRAMUSCULAR; INTRAVENOUS EVERY 6 HOURS PRN
Status: CANCELLED | OUTPATIENT
Start: 2018-01-01

## 2018-01-01 RX ORDER — IPRATROPIUM BROMIDE AND ALBUTEROL SULFATE 2.5; .5 MG/3ML; MG/3ML
3 SOLUTION RESPIRATORY (INHALATION)
Status: DISCONTINUED | OUTPATIENT
Start: 2018-01-01 | End: 2018-01-01 | Stop reason: HOSPADM

## 2018-01-01 RX ORDER — IBUPROFEN 600 MG/1
600 TABLET ORAL EVERY 6 HOURS PRN
Status: DISCONTINUED | OUTPATIENT
Start: 2018-01-01 | End: 2018-01-01 | Stop reason: HOSPADM

## 2018-01-01 RX ORDER — DOXYCYCLINE 100 MG/1
100 CAPSULE ORAL 2 TIMES DAILY
COMMUNITY
End: 2018-01-01

## 2018-01-01 RX ORDER — ONDANSETRON 2 MG/ML
INJECTION INTRAMUSCULAR; INTRAVENOUS AS NEEDED
Status: DISCONTINUED | OUTPATIENT
Start: 2018-01-01 | End: 2018-01-01 | Stop reason: SURG

## 2018-01-01 RX ORDER — IBUPROFEN 400 MG/1
200 TABLET ORAL EVERY 6 HOURS PRN
COMMUNITY
End: 2018-01-01

## 2018-01-01 RX ORDER — LIDOCAINE HYDROCHLORIDE AND EPINEPHRINE 10; 10 MG/ML; UG/ML
INJECTION, SOLUTION INFILTRATION; PERINEURAL CODE/TRAUMA/SEDATION MEDICATION
Status: COMPLETED | OUTPATIENT
Start: 2018-01-01 | End: 2018-01-01

## 2018-01-01 RX ORDER — FLUOROURACIL 50 MG/ML
806 INJECTION, SOLUTION INTRAVENOUS ONCE
Status: DISCONTINUED | OUTPATIENT
Start: 2018-01-01 | End: 2018-01-01

## 2018-01-01 RX ORDER — LEVOFLOXACIN 5 MG/ML
500 INJECTION, SOLUTION INTRAVENOUS ONCE
Status: COMPLETED | OUTPATIENT
Start: 2018-01-01 | End: 2018-01-01

## 2018-01-01 RX ORDER — MIDAZOLAM HYDROCHLORIDE 1 MG/ML
INJECTION INTRAMUSCULAR; INTRAVENOUS CODE/TRAUMA/SEDATION MEDICATION
Status: COMPLETED | OUTPATIENT
Start: 2018-01-01 | End: 2018-01-01

## 2018-01-01 RX ORDER — ALBUMIN (HUMAN) 12.5 G/50ML
SOLUTION INTRAVENOUS
Status: COMPLETED | OUTPATIENT
Start: 2018-01-01 | End: 2018-01-01

## 2018-01-01 RX ORDER — DOXYCYCLINE 100 MG/1
100 CAPSULE ORAL 2 TIMES DAILY
Qty: 20 CAPSULE | Refills: 0 | Status: SHIPPED | OUTPATIENT
Start: 2018-01-01 | End: 2018-01-01

## 2018-01-01 RX ORDER — POTASSIUM CHLORIDE 1.5 G/1.77G
20 POWDER, FOR SOLUTION ORAL DAILY
COMMUNITY
End: 2018-01-01

## 2018-01-01 RX ORDER — LIDOCAINE HYDROCHLORIDE 10 MG/ML
INJECTION, SOLUTION INFILTRATION; PERINEURAL AS NEEDED
Status: DISCONTINUED | OUTPATIENT
Start: 2018-01-01 | End: 2018-01-01 | Stop reason: SURG

## 2018-01-01 RX ORDER — VARENICLINE TARTRATE 25 MG
KIT ORAL
Qty: 53 TABLET | Refills: 0 | Status: SHIPPED | OUTPATIENT
Start: 2018-01-01 | End: 2018-01-01

## 2018-01-01 RX ORDER — SODIUM CHLORIDE 9 MG/ML
INJECTION, SOLUTION INTRAVENOUS CONTINUOUS PRN
Status: DISCONTINUED | OUTPATIENT
Start: 2018-01-01 | End: 2018-01-01 | Stop reason: SURG

## 2018-01-01 RX ORDER — LANOLIN ALCOHOL/MO/W.PET/CERES
1 CREAM (GRAM) TOPICAL 2 TIMES DAILY
COMMUNITY
End: 2018-01-01

## 2018-01-01 RX ORDER — ESOMEPRAZOLE MAGNESIUM 40 MG/1
40 CAPSULE, DELAYED RELEASE ORAL EVERY MORNING
Refills: 0 | COMMUNITY
Start: 2018-01-01 | End: 2018-01-01 | Stop reason: HOSPADM

## 2018-01-01 RX ADMIN — FLUOROURACIL 672 MG: 50 INJECTION, SOLUTION INTRAVENOUS at 14:08

## 2018-01-01 RX ADMIN — LIDOCAINE HYDROCHLORIDE 8 ML: 10 INJECTION, SOLUTION INFILTRATION; PERINEURAL at 12:06

## 2018-01-01 RX ADMIN — ONDANSETRON 4 MG: 2 INJECTION INTRAMUSCULAR; INTRAVENOUS at 16:47

## 2018-01-01 RX ADMIN — PROPOFOL 50 MG: 10 INJECTION, EMULSION INTRAVENOUS at 09:44

## 2018-01-01 RX ADMIN — IOHEXOL 100 ML: 350 INJECTION, SOLUTION INTRAVENOUS at 11:06

## 2018-01-01 RX ADMIN — IPRATROPIUM BROMIDE AND ALBUTEROL SULFATE 3 ML: .5; 3 SOLUTION RESPIRATORY (INHALATION) at 09:23

## 2018-01-01 RX ADMIN — PROPOFOL 138 MCG/KG/MIN: 10 INJECTION, EMULSION INTRAVENOUS at 11:50

## 2018-01-01 RX ADMIN — MIDAZOLAM HYDROCHLORIDE 1 MG: 1 INJECTION, SOLUTION INTRAMUSCULAR; INTRAVENOUS at 11:49

## 2018-01-01 RX ADMIN — PROPOFOL 100 MG: 10 INJECTION, EMULSION INTRAVENOUS at 11:50

## 2018-01-01 RX ADMIN — REGADENOSON 0.4 MG: 0.08 INJECTION, SOLUTION INTRAVENOUS at 09:58

## 2018-01-01 RX ADMIN — FENTANYL CITRATE 25 MCG: 50 INJECTION, SOLUTION INTRAMUSCULAR; INTRAVENOUS at 11:55

## 2018-01-01 RX ADMIN — LIDOCAINE HYDROCHLORIDE 6 ML: 10 INJECTION, SOLUTION INFILTRATION; PERINEURAL at 13:09

## 2018-01-01 RX ADMIN — ONDANSETRON 4 MG: 2 INJECTION INTRAMUSCULAR; INTRAVENOUS at 09:06

## 2018-01-01 RX ADMIN — PROPOFOL 50 MG: 10 INJECTION, EMULSION INTRAVENOUS at 09:27

## 2018-01-01 RX ADMIN — SODIUM CHLORIDE 50 ML/HR: 0.9 INJECTION, SOLUTION INTRAVENOUS at 11:32

## 2018-01-01 RX ADMIN — ALBUMIN HUMAN 25 G: 0.25 SOLUTION INTRAVENOUS at 12:21

## 2018-01-01 RX ADMIN — DEXTROSE 20 ML/HR: 50 INJECTION, SOLUTION INTRAVENOUS at 10:46

## 2018-01-01 RX ADMIN — LIDOCAINE HYDROCHLORIDE 2 ML: 10 INJECTION, SOLUTION INFILTRATION; PERINEURAL at 10:10

## 2018-01-01 RX ADMIN — ALBUMIN HUMAN 25 G: 0.25 SOLUTION INTRAVENOUS at 12:20

## 2018-01-01 RX ADMIN — ALBUMIN HUMAN 50 G: 0.25 SOLUTION INTRAVENOUS at 14:15

## 2018-01-01 RX ADMIN — PREGABALIN 200 MG: 100 CAPSULE ORAL at 07:32

## 2018-01-01 RX ADMIN — FENTANYL CITRATE 25 MCG: 50 INJECTION, SOLUTION INTRAMUSCULAR; INTRAVENOUS at 11:49

## 2018-01-01 RX ADMIN — ONDANSETRON 4 MG: 2 INJECTION INTRAMUSCULAR; INTRAVENOUS at 14:28

## 2018-01-01 RX ADMIN — OXALIPLATIN 143 MG: 5 INJECTION, SOLUTION, CONCENTRATE INTRAVENOUS at 11:55

## 2018-01-01 RX ADMIN — LEVOFLOXACIN 500 MG: 5 INJECTION, SOLUTION INTRAVENOUS at 08:19

## 2018-01-01 RX ADMIN — PROPOFOL 100 MG: 10 INJECTION, EMULSION INTRAVENOUS at 09:25

## 2018-01-01 RX ADMIN — ENOXAPARIN SODIUM 40 MG: 40 INJECTION SUBCUTANEOUS at 07:33

## 2018-01-01 RX ADMIN — HYDROMORPHONE HYDROCHLORIDE 1 MG: 1 INJECTION, SOLUTION INTRAMUSCULAR; INTRAVENOUS; SUBCUTANEOUS at 04:26

## 2018-01-01 RX ADMIN — ALBUMIN HUMAN 25 G: 0.25 SOLUTION INTRAVENOUS at 13:00

## 2018-01-01 RX ADMIN — HYDROMORPHONE HYDROCHLORIDE 2 MG: 2 INJECTION, SOLUTION INTRAMUSCULAR; INTRAVENOUS; SUBCUTANEOUS at 14:45

## 2018-01-01 RX ADMIN — LIDOCAINE HYDROCHLORIDE 100 MG: 10 INJECTION, SOLUTION INFILTRATION; PERINEURAL at 08:16

## 2018-01-01 RX ADMIN — FENTANYL CITRATE 100 MCG: 50 INJECTION, SOLUTION INTRAMUSCULAR; INTRAVENOUS at 08:16

## 2018-01-01 RX ADMIN — DEXAMETHASONE SODIUM PHOSPHATE: 10 INJECTION, SOLUTION INTRAMUSCULAR; INTRAVENOUS at 10:46

## 2018-01-01 RX ADMIN — LEUCOVORIN CALCIUM 672 MG: 200 INJECTION, POWDER, LYOPHILIZED, FOR SOLUTION INTRAMUSCULAR; INTRAVENOUS at 11:53

## 2018-01-01 RX ADMIN — SODIUM CHLORIDE, SODIUM LACTATE, POTASSIUM CHLORIDE, AND CALCIUM CHLORIDE 125 ML/HR: .6; .31; .03; .02 INJECTION, SOLUTION INTRAVENOUS at 08:07

## 2018-01-01 RX ADMIN — Medication 300 UNITS: at 14:14

## 2018-01-01 RX ADMIN — PROPOFOL 50 MG: 10 INJECTION, EMULSION INTRAVENOUS at 09:32

## 2018-01-01 RX ADMIN — ALBUMIN HUMAN 25 G: 0.25 SOLUTION INTRAVENOUS at 11:50

## 2018-01-01 RX ADMIN — PROPOFOL 180 MG: 10 INJECTION, EMULSION INTRAVENOUS at 08:16

## 2018-01-01 RX ADMIN — ROCURONIUM BROMIDE 50 MG: 10 INJECTION INTRAVENOUS at 08:16

## 2018-01-01 RX ADMIN — HYDROMORPHONE HYDROCHLORIDE 2 MG: 2 INJECTION INTRAMUSCULAR; INTRAVENOUS; SUBCUTANEOUS at 20:56

## 2018-01-01 RX ADMIN — SODIUM CHLORIDE 1000 ML: 0.9 INJECTION, SOLUTION INTRAVENOUS at 15:45

## 2018-01-01 RX ADMIN — SODIUM CHLORIDE, SODIUM LACTATE, POTASSIUM CHLORIDE, AND CALCIUM CHLORIDE 75 ML/HR: .6; .31; .03; .02 INJECTION, SOLUTION INTRAVENOUS at 08:12

## 2018-01-01 RX ADMIN — HYDROMORPHONE HYDROCHLORIDE 2 MG: 2 INJECTION INTRAMUSCULAR; INTRAVENOUS; SUBCUTANEOUS at 06:33

## 2018-01-01 RX ADMIN — SODIUM CHLORIDE: 0.9 INJECTION, SOLUTION INTRAVENOUS at 09:23

## 2018-01-01 RX ADMIN — SODIUM CHLORIDE, SODIUM LACTATE, POTASSIUM CHLORIDE, AND CALCIUM CHLORIDE: .6; .31; .03; .02 INJECTION, SOLUTION INTRAVENOUS at 08:09

## 2018-01-01 RX ADMIN — SODIUM CHLORIDE, SODIUM LACTATE, POTASSIUM CHLORIDE, AND CALCIUM CHLORIDE 125 ML/HR: .6; .31; .03; .02 INJECTION, SOLUTION INTRAVENOUS at 07:58

## 2018-01-01 RX ADMIN — SUGAMMADEX 436 MG: 100 INJECTION, SOLUTION INTRAVENOUS at 09:16

## 2018-01-01 RX ADMIN — DIPHENOXYLATE HYDROCHLORIDE AND ATROPINE SULFATE 2 TABLET: 2.5; .025 TABLET ORAL at 12:23

## 2018-01-01 RX ADMIN — HYDROMORPHONE HYDROCHLORIDE 1 MG: 1 INJECTION, SOLUTION INTRAMUSCULAR; INTRAVENOUS; SUBCUTANEOUS at 16:49

## 2018-01-01 RX ADMIN — PROPOFOL 100 MCG/KG/MIN: 10 INJECTION, EMULSION INTRAVENOUS at 09:25

## 2018-01-01 RX ADMIN — HYDROMORPHONE HYDROCHLORIDE 1 MG: 1 INJECTION, SOLUTION INTRAMUSCULAR; INTRAVENOUS; SUBCUTANEOUS at 01:35

## 2018-01-01 RX ADMIN — CEFAZOLIN SODIUM 2000 MG: 2 SOLUTION INTRAVENOUS at 08:40

## 2018-01-01 RX ADMIN — SODIUM CHLORIDE, SODIUM LACTATE, POTASSIUM CHLORIDE, AND CALCIUM CHLORIDE: .6; .31; .03; .02 INJECTION, SOLUTION INTRAVENOUS at 07:59

## 2018-01-01 RX ADMIN — HYDROMORPHONE HYDROCHLORIDE 1 MG: 1 INJECTION, SOLUTION INTRAMUSCULAR; INTRAVENOUS; SUBCUTANEOUS at 19:20

## 2018-01-01 RX ADMIN — LIDOCAINE HYDROCHLORIDE AND EPINEPHRINE 10 ML: 10; 10 INJECTION, SOLUTION INFILTRATION; PERINEURAL at 11:50

## 2018-01-01 RX ADMIN — MORPHINE SULFATE 2 MG: 2 INJECTION, SOLUTION INTRAMUSCULAR; INTRAVENOUS at 18:15

## 2018-01-01 RX ADMIN — SODIUM CHLORIDE: 0.9 INJECTION, SOLUTION INTRAVENOUS at 08:28

## 2018-01-01 RX ADMIN — ALBUMIN HUMAN 12.5 G: 0.25 SOLUTION INTRAVENOUS at 13:32

## 2018-01-01 RX ADMIN — ALBUMIN HUMAN 12.5 G: 0.25 SOLUTION INTRAVENOUS at 13:05

## 2018-01-01 RX ADMIN — ACETAMINOPHEN 975 MG: 325 TABLET, FILM COATED ORAL at 07:33

## 2018-01-01 RX ADMIN — LIDOCAINE HYDROCHLORIDE AND EPINEPHRINE 5 ML: 10; 10 INJECTION, SOLUTION INFILTRATION; PERINEURAL at 11:55

## 2018-01-01 RX ADMIN — ALBUMIN HUMAN 25 G: 0.25 SOLUTION INTRAVENOUS at 11:10

## 2018-06-11 NOTE — PRE-PROCEDURE INSTRUCTIONS
Pre-Surgery Instructions:   Medication Instructions    amLODIPine (NORVASC) 5 mg tablet Patient was instructed by Physician and understands   furosemide (LASIX) 80 mg tablet Patient was instructed by Physician and understands   glucosamine-chondroitin 500-400 MG tablet Patient was instructed by Physician and understands   losartan (COZAAR) 100 MG tablet Instructed patient per Anesthesia Guidelines   metFORMIN (GLUCOPHAGE) 500 mg tablet Instructed patient per Anesthesia Guidelines   potassium chloride (KLOR-CON) 20 mEq packet Patient was instructed by Physician and understands   pregabalin (LYRICA) 200 MG capsule Patient was instructed by Physician and understands   Probiotic Product (PROBIOTIC-10 ULTIMATE) CAPS Patient was instructed by Physician and understands

## 2018-06-12 NOTE — ANESTHESIA PREPROCEDURE EVALUATION
Review of Systems/Medical History  Patient summary reviewed  Chart reviewed  No history of anesthetic complications     Cardiovascular  Hypertension ,    Pulmonary  Smoker cigarette smoker  ,        GI/Hepatic       Kidney stones,   Comment: Active UTI on Abx     Endo/Other  Diabetes type 2 ,   Obesity    GYN       Hematology   Musculoskeletal       Neurology    Diabetic neuropathy (feet),    Psychology           Physical Exam    Airway    Mallampati score: III  TM Distance: >3 FB  Neck ROM: full     Dental       Cardiovascular  Rhythm: regular, Rate: normal,     Pulmonary  Breath sounds clear to auscultation,     Other Findings        Anesthesia Plan  ASA Score- 2     Anesthesia Type- IV sedation with anesthesia with ASA Monitors  Additional Monitors:   Airway Plan:         Plan Factors-    Induction- intravenous  Postoperative Plan-     Informed Consent- Anesthetic plan and risks discussed with patient

## 2018-06-13 NOTE — OP NOTE
OPERATIVE REPORT  PATIENT NAME: Sujey Hensley    :  1945  MRN: [id number]  Pt Location: [facility] GI ROOM 01    SURGERY DATE: 2018    Surgeon(s) and Role:     Sofía Walls MD - Primary    Preop Diagnosis:  Abdominal pain [R10 9]  Gastro-esophageal reflux disease without esophagitis [K21 9]    Post-Op Diagnosis Codes:     * Peptic ulcer [K27 9]     * Colon polyps [K63 5]     * Internal hemorrhoid [K64 8]    Procedures  EGD with biopsies and Colonoscopy with hot snare polypectomies, cold biopsies polypectomy     First  screening colonoscopy    Specimen(s):  ID Type Source Tests Collected by Time Destination   1 : 1   cold bx  antum, ulcerative area Tissue Stomach TISSUE EXAM Jasmeet Garcia RN 2018 0929    2 : 2  cold bx  lower esophagus Tissue Esophagus TISSUE Zack Knox MD 2018 0933    3 : 3  hot snare hepatic flexure polyp  Tissue Polyp, Colorectal TISSUE EXAM Ct Herrera MD 2018 0942    4 : 4  ascending colonpolyp x 2 cold bx  Tissue Polyp, Colorectal TISSUE EXAM Ct Herrera MD 2018 2276    5 : 5  cold bx  sigmoid colon polyp Tissue Polyp, Colorectal TISSUE EXAM Ct Herrera MD 2018 1010    6 : 6  cold bx   rectal polyp Tissue Polyp, Colorectal TISSUE EXAM Ct Herrera MD 2018 1015        Estimated Blood Loss:   Minimal         Anesthesia Type:   IV Sedation with Anesthesia    Operative Indications:  Abdominal pain [R10 9]  Gastro-esophageal reflux disease without esophagitis [K21 9]  Ovarian mass     Operative Findings:  EGD- under conscious sedation, upper endoscope was passed through the mouth and upper esophageal sphincter was intubated  Upper, middle and lower esophagus appeared normal   GE junction, there is a small hiatal hernia  Multiple lower esophageal biopsies were done    Scope was advanced in the stomach on retroflexion fundus appeared normal, gastric body appeared erythematous, antrum appeared erythematous with evidence of small ulcer and polypoidal lesion in the pyloric channel, biopsies were done  Tissue appeared very friable  Scope was advanced from pylorus to duodenal bulb which appeared normal, 2nd part of duodenum where duodenal villi appeared normal    Colonoscopy-under conscious sedation, digital rectal exam and perianal examination was done  Pediatric colonoscope was passed from anus and reach all way up to the cecum  Cecum landmark was identified with ileocecal valve  and appendiceal orifice quality of prep was suboptimal with liquid stool throughout the colon  Cecum appeared normal  In the ascending colon, there are 2 small sessile polyp which are  removed with cold biopsy polypectomy  In the hepatic flexure, there is a 2 cm size large polypoidal lesion which was removed with hot snare polypectomy  Two Endoclip was applied at polypectomy site  Large amount of stool throughout the transverse colon, visualization was suboptimal   Descending colon appeared normal  In the sigmoid colon, there is a too small to medium size inflammatory polyp, biopsies were done  In the rectum, there is a 4 mm size sessile polyp which was removed with cold biopsy polypectomy  On retroflexion there is a medium-size internal hemorrhoid    Impression-1  Large colon polyps removed with hot snare polypectomy other multiple colon polyps which are removed with cold biopsies polypectomy  2  Peptic ulcer disease along with a polypoidal lesion in the antrum   3  Small hiatal hernia    Recommendation-1  Follow-up biopsies  2  Repeat colonoscopy in 1-2 years   3  Pantoprazole 40 mg p o  q day   4    Follow-up with GYN oncologist     Complications:   None        Patient Disposition:  PACU     SIGNATURE: Melina Mciknley MD  DATE: June 13, 2018  TIME: 10:19 AM

## 2018-06-13 NOTE — H&P
History and Physical - [facility]  Miley Henriquez 68 y o  female MRN: [id number]    HPI: Miley Henriquez is a 68y o  year old female who presents with epigastric and lower abdominal pain  Review of Systems    Historical Information   Past Medical History:   Diagnosis Date    Diabetes mellitus ([address] )     type 2    Fever     low grade for over a month     Fibromyalgia, primary     Hypertension     Kidney disorder     Mass     ovary or uterus    Stomach ache     UTI (urinary tract infection)     Dx on 06/11/18 per the pt post urologist call    Venous insufficiency      Past Surgical History:   Procedure Laterality Date    CHOLECYSTECTOMY  1995    lap    KNEE ARTHROSCOPY Left     x5    KNEE ARTHROSCOPY Right     x2     Social History   History   Alcohol Use    Yes     Comment: daily     History   Drug Use No     History   Smoking Status    Current Every Day Smoker    Packs/day: 2 00    Years: 50 00   Smokeless Tobacco    Never Used     Family History   Problem Relation Age of Onset   [place] Cancer Mother      breast age 48   [place] Early death Mother     No Known Problems Daughter     No Known Problems Son     No Known Problems Son        Meds/Allergies     Prescriptions Prior to Admission   Medication    amLODIPine (NORVASC) 5 mg tablet    furosemide (LASIX) 80 mg tablet    glucosamine-chondroitin 500-400 MG tablet    losartan (COZAAR) 100 MG tablet    metFORMIN (GLUCOPHAGE) 500 mg tablet    potassium chloride (KLOR-CON) 20 mEq packet    pregabalin (LYRICA) 200 MG capsule    Probiotic Product (PROBIOTIC-10 ULTIMATE) CAPS       Allergies   Allergen Reactions    Pentazocine Abdominal Pain     Reaction Date: [id number];     Sulfa Antibiotics Hives       Objective     Blood pressure 141/67, pulse 88, temperature 98 6 °F (37 °C), temperature source Tympanic, resp  rate 20, weight 107 kg (235 lb), SpO2 95 %        PHYSICAL EXAM    Gen: NAD  CV: RRR  CHEST: Clear  ABD: soft, NT/ND  EXT: no edema  Neuro: AAO      ASSESSMENT/PLAN:  This is a 68y o  year old female here for elective EGD and colonoscopy  PLAN:   Procedure:  Risk and benefit of the procedure was discussed will proceed under conscious sedation

## 2018-06-13 NOTE — ANESTHESIA POSTPROCEDURE EVALUATION
Post-Op Assessment Note      CV Status:  Stable    Mental Status:  Awake    Hydration Status:  Stable    PONV Controlled:  None    Airway Patency:  Patent    Post Op Vitals Reviewed: Yes          Staff: Anesthesiologist           BP      Temp      Pulse (P) 95 (06/13/18 1023)   Resp (P) 18 (06/13/18 1023)    SpO2 (P) 98 % (06/13/18 1023)

## 2018-06-19 NOTE — ANESTHESIA PREPROCEDURE EVALUATION
Venous insufficiency    Diabetes mellitus (HCC) type 2   Hypertension    Stomach ache    Fever low grade for over a month    Mass ovary or uterus   Kidney disorder    UTI (urinary tract infection) Dx on 06/11/18 per the pt post urologist call       Review of Systems/Medical History  Patient summary reviewed  Chart reviewed      Cardiovascular  Hypertension on > 1 medication,    Pulmonary  Smoker cigarette smoker  , Tobacco cessation counseling given Cumulative Pack Years: 48,   Comment: Chronic cough and post nasal discharge      GI/Hepatic       Kidney disease ,        Endo/Other  Diabetes well controlled Oral agent,   Obesity  morbid obesity   GYN       Hematology   Musculoskeletal    Comment: Fibromyalgia, primary      Neurology   Psychology     Chronic pain,            Physical Exam    Airway    Mallampati score: II  TM Distance: >3 FB  Neck ROM: full     Dental       Cardiovascular  Rhythm: regular, Rate: normal,     Pulmonary  Wheezes,     Other Findings        Anesthesia Plan  ASA Score- 3     Anesthesia Type- general with ASA Monitors  Additional Monitors:   Airway Plan:         Plan Factors-    Induction- intravenous  Postoperative Plan- Plan for postoperative opioid use  Planned trial extubation    Informed Consent-   I personally reviewed this patient with the CRNA  Discussed and agreed on the Anesthesia Plan with the CRNA  Sherre Soulier

## 2018-06-19 NOTE — PRE-PROCEDURE INSTRUCTIONS
My Surgical Experience    The following information was developed to assist you to prepare for your operation  What do I need to do before coming to the hospital?   Arrange for a responsible person to drive you to and from the hospital    Arrange care for your children at home  Children are not allowed in the recovery areas of the hospital   Plan to wear clothing that is easy to put on and take off  If you are having shoulder surgery, wear a shirt that buttons or zippers in the front  Bathing  o Shower the evening before and the morning of your surgery with an antibacterial soap  Please refer to the Pre Op Showering Instructions for Surgery Patients Sheet   o Remove nail polish and all body piercing jewelry  o Do not shave any body part for at least 24 hours before surgery-this includes face, arms, legs and upper body  Food  o Nothing to eat or drink after midnight the night before your surgery  This includes candy and chewing gum  o Exception: If your surgery is after 12:00pm (noon), you may have clear liquids such as 7-Up®, ginger ale, apple or cranberry juice, Jell-O®, water, or clear broth until 8:00 am  o Do not drink milk or juice with pulp on the morning before surgery  o Do not drink alcohol 24 hours before surgery  Medicine  o Follow instructions you received from your surgeon about which medicines you may take on the day of surgery  o If instructed to take medicine on the morning of surgery, take pills with just a small sip of water  Call your prescribing doctor for specific infroamtion on what to do if you take insulin    What should I bring to the hospital?    Bring:  Nonnie Senters or a walker, if you have them, for foot or knee surgery   A list of the daily medicines, vitamins, minerals, herbals and nutritional supplements you take   Include the dosages of medicines and the time you take them each day   Glasses, dentures or hearing aids   Minimal clothing; you will be wearing hospital sleepwear   Photo ID; required to verify your identity   If you have a Living Will or Power of , bring a copy of the documents   If you have an ostomy, bring an extra pouch and any supplies you use    Do not bring   Medicines or inhalers   Money, valuables or jewelry    What other information should I know about the day of surgery?  Notify your surgeons if you develop a cold, sore throat, cough, fever, rash or any other illness   Report to the Ambulatory Surgical/Same Day Surgery Unit   You will be instructed to stop at Registration only if you have not been pre-registered   Inform your  fi they do not stay that they will be asked by the staff to leave a phone number where they can be reached   Be available to be reached before surgery  In the event the operating room schedule changes, you may be asked to come in earlier or later than expected    *It is important to tell your doctor and others involved in your health care if you are taking or have been taking any non-prescription drugs, vitamins, minerals, herbals or other nutritional supplements  Any of these may interact with some food or medicines and cause a reaction      Pre-Surgery Instructions:   Medication Instructions    amLODIPine (NORVASC) 5 mg tablet Instructed patient per Anesthesia Guidelines   furosemide (LASIX) 80 mg tablet Instructed patient per Anesthesia Guidelines   glucosamine-chondroitin 500-400 MG tablet Instructed patient per Anesthesia Guidelines   losartan (COZAAR) 100 MG tablet Instructed patient per Anesthesia Guidelines   metFORMIN (GLUCOPHAGE) 500 mg tablet Instructed patient per Anesthesia Guidelines   potassium chloride (KLOR-CON) 20 mEq packet Instructed patient per Anesthesia Guidelines   pregabalin (LYRICA) 200 MG capsule Instructed patient per Anesthesia Guidelines   Probiotic Product (PROBIOTIC-10 ULTIMATE) CAPS Instructed patient per Anesthesia Guidelines  to take     To take amlodipine and losartan a m   Of surgery

## 2018-06-20 NOTE — H&P
H&P Exam - Urology       Patient: Carole Kinney   : 1945 Sex: female   MRN: [id number]     CSN: [id number]      History of Present Illness   HPI:  Carole Kinney is a 68 y o  female who presents with left multiple stones, for debulking cysto/left ureteroscopy/laser        Review of Systems:   Constitutional:  Negative for activity change, fever, chills and diaphoresis  HENT: Negative for hearing loss and trouble swallowing  Eyes: Negative for itching and visual disturbance  Respiratory: Negative for chest tightness and shortness of breath  Cardiovascular: Negative for chest pain, edema  Gastrointestinal: Negative for abdominal distention, na abdominal pain, constipation, diarrhea, Nausea and vomiting  Genitourinary: Negative for decreased urine volume, difficulty urinating, dysuria, enuresis, frequency, hematuria and urgency  Musculoskeletal: Negative for gait problem and myalgias  Neurological: Negative for dizziness and headaches  Hematological: Does not bruise/bleed easily  Historical Information   Past Medical History:   Diagnosis Date    Diabetes mellitus (HCC)     type 2    Fever     low grade for over a month     Fibromyalgia, primary     Hypertension     Kidney disorder     Mass     ovary or uterus    Stomach ache     UTI (urinary tract infection)     Dx on 18 per the pt post urologist call    Venous insufficiency      Past Surgical History:   Procedure Laterality Date    CATARACT EXTRACTION     [address]    lap    COLONOSCOPY N/A 2018    Procedure: COLONOSCOPY;  Surgeon: Melina Mckinley MD;  Location: [facility] GI LAB; Service: Gastroenterology    KNEE ARTHROSCOPY Left     x5    KNEE ARTHROSCOPY Right     x2    OR EGD TRANSORAL BIOPSY SINGLE/MULTIPLE N/A 2018    Procedure: ESOPHAGOGASTRODUODENOSCOPY (EGD); Surgeon: Melina Mckinley MD;  Location: [facility] GI LAB;   Service: Gastroenterology     Social History   History   Alcohol Use    1 2 oz/week    2 Glasses of wine per week     Comment: daily     History   Drug Use No     History   Smoking Status    Current Every Day Smoker    Packs/day: 2 00    Years: 50 00    Types: Cigarettes   Smokeless Tobacco    Never Used     Family History:   Family History   Problem Relation Age of Onset   [place]ita Abur[place] Cancer Mother         breast age 48    Early death Mother     No Known Problems Daughter     No Known Problems Son     No Known Problems Son        Meds/Allergies   Prescriptions Prior to Admission   Medication    amLODIPine (NORVASC) 5 mg tablet    furosemide (LASIX) 80 mg tablet    glucosamine-chondroitin 500-400 MG tablet    losartan (COZAAR) 100 MG tablet    metFORMIN (GLUCOPHAGE) 500 mg tablet    potassium chloride (KLOR-CON) 20 mEq packet    pregabalin (LYRICA) 200 MG capsule    Probiotic Product (PROBIOTIC-10 ULTIMATE) CAPS     Allergies   Allergen Reactions    Pentazocine Abdominal Pain     Reaction Date: [id number];     Sulfa Antibiotics Hives       Objective   Vitals: Wt 109 kg (240 lb)   BMI 37 03 kg/m²     Physical Exam:  General Alert awake   Normocephalic atraumatic PERRLA  Lungs clear bilaterally  Cardiac normal S1 normal S2  Abdomen soft, flank pain  Extremities no edema    No intake/output data recorded      Invasive Devices          No matching active lines, drains, or airways              Lab Results: CBC: No results found for: WBC, HGB, HCT, MCV, PLT, ADJUSTEDWBC, MCH, MCHC, RDW, MPV, NRBC  CMP: No results found for: NA, CL, CO2, ANIONGAP, BUN, CREATININE, GLUCOSE, CALCIUM, AST, ALT, ALKPHOS, PROT, ALBUMIN, BILITOT, EGFR  Urinalysis: No results found for: Blade Campi, SPECGRAV, PHUR, LEUKOCYTESUR, NITRITE, PROTEINUA, GLUCOSEU, KETONESU, BILIRUBINUR, BLOODU  Urine Culture: No results found for: URINECX  PSA: No results found for: PSA        Assessment/ Plan:  Cysto/left ureteroscopy/laser/stent      Imre Tavarez MD

## 2018-06-21 NOTE — PERIOPERATIVE NURSING NOTE
Dr Sae Barber agrees to patient going home and having tests done as an out patient  Dr Everett Lee made aware and dicharged patient

## 2018-06-21 NOTE — CONSULTS
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  2  Hypertension  3  Diabetes  4  Bilateral LE edema  5  Tobacco abuse - she smokes 2 ppd    Plan:  - discussed with patient and her  in detail  She does not wish to stay for evaluation  Symptoms appear to be chronic with acute exacerbation  Will plan for chest Xray, echocardiogram and stress test to be done as outpatient  - discussed smoking cessation  History of Present Illness   HPI: Arti Holt is a 68y o  year old female who presented to [place] [address] for an elective cystoscopy  According to the patient, she was transferring to the OR table and became very short of breath  Symptoms persisted for several minutes then resolved with rest   She denies any chest pain  Telemetry monitor showed ST segment depression  ECG showed q waves inferiorly  No prior study for comparison  The patient feels dyspnea with minimal exertion, but activity is limited due to back pain  She denies chest pain with exertion  She has bilateral lower extremity edema for the past several months that is worse at the end of the day  No previous cardiac testing  Review of Systems:    Review of Systems   Constitutional: Negative for chills, fatigue and fever  HENT: Negative for congestion, nosebleeds and postnasal drip  Respiratory: Positive for cough and shortness of breath  Negative for chest tightness  Cardiovascular: Positive for leg swelling  Negative for chest pain and palpitations  Gastrointestinal: Negative for abdominal distention, abdominal pain, diarrhea, nausea and vomiting  Endocrine: Negative for polydipsia, polyphagia and polyuria  Genitourinary: Positive for dysuria  Musculoskeletal: Positive for arthralgias, gait problem and myalgias  Skin: Positive for wound  Negative for color change, pallor and rash  Allergic/Immunologic: Negative for environmental allergies, food allergies and immunocompromised state  Neurological: Negative for dizziness, seizures, syncope and light-headedness  Hematological: Negative for adenopathy  Does not bruise/bleed easily  Psychiatric/Behavioral: Negative for dysphoric mood  The patient is not nervous/anxious  Historical Information   Past Medical History:   Diagnosis Date    Diabetes mellitus (HCC)     type 2    Fever     low grade for over a month     Fibromyalgia, primary     Hypertension     Kidney disorder     Mass     ovary or uterus    Stomach ache     UTI (urinary tract infection)     Dx on 06/11/18 per the pt post urologist call    Venous insufficiency      Past Surgical History:   Procedure Laterality Date    CATARACT EXTRACTION     [address]    lap    COLONOSCOPY N/A 6/13/2018    Procedure: COLONOSCOPY;  Surgeon: Gurdeep Cisse MD;  Location: [place] [address] GI LAB; Service: Gastroenterology    KNEE ARTHROSCOPY Left     x5    KNEE ARTHROSCOPY Right     x2    SC EGD TRANSORAL BIOPSY SINGLE/MULTIPLE N/A 6/13/2018    Procedure: ESOPHAGOGASTRODUODENOSCOPY (EGD); Surgeon: Gurdeep Cisse MD;  Location: [facility] GI LAB;   Service: Gastroenterology     History   Alcohol Use    1 2 oz/week    2 Glasses of wine per week     Comment: daily     History   Drug Use No     History   Smoking Status    Current Every Day Smoker    Packs/day: 2 00    Years: 50 00    Types: Cigarettes   Smokeless Tobacco    Never Used       Family History:   Family History   Problem Relation Age of Onset   Maren Cortez Cancer Mother         breast age 48    Early death Mother     No Known Problems Daughter     No Known Problems Son     No Known Problems Son        Meds/Allergies   PTA meds:   Prior to Admission Medications   Prescriptions Last Dose Informant Patient Reported? Taking? Probiotic Product (PROBIOTIC-10 ULTIMATE) CAPS 6/20/2018 at 1000  Yes Yes   Sig: Take by mouth   amLODIPine (NORVASC) 5 mg tablet 6/21/2018 at 0545  Yes Yes   Sig: Take 5 mg by mouth every morning   furosemide (LASIX) 80 mg tablet 6/20/2018 at 0800  Yes Yes   Sig: Take 80 mg by mouth daily   glucosamine-chondroitin 500-400 MG tablet 6/20/2018 at 1830  Yes Yes   Sig: Take 1 tablet by mouth 2 (two) times a day     losartan (COZAAR) 100 MG tablet 6/21/2018 at 0545  Yes Yes   Sig: Take 100 mg by mouth every morning   metFORMIN (GLUCOPHAGE) 500 mg tablet 6/20/2018 at 1830  Yes Yes   Sig: Take 500 mg by mouth 2 (two) times a day with meals   potassium chloride (KLOR-CON) 20 mEq packet 6/20/2018 at 0800  Yes Yes   Sig: Take 20 mEq by mouth daily   pregabalin (LYRICA) 200 MG capsule 6/20/2018 at 1830  Yes Yes   Sig: Take 200 mg by mouth 2 (two) times a day      Facility-Administered Medications: None     Allergies   Allergen Reactions    Pentazocine Abdominal Pain     Reaction Date: [id number];     Sulfa Antibiotics Hives       Objective   Vitals: Blood pressure 137/84, pulse 100, resp  rate 20, weight 109 kg (240 lb), SpO2 95 %  , Body mass index is 37 03 kg/m²  Physical Exam   Constitutional: She appears healthy  No distress  HENT:   Nose: Nose normal    Mouth/Throat: Oropharynx is clear  Eyes: Conjunctivae are normal  Pupils are equal, round, and reactive to light  Neck: Neck supple  No JVD present  Cardiovascular: Normal rate and regular rhythm  Exam reveals no distant heart sounds and no friction rub  No murmur heard  Pulmonary/Chest: Effort normal and breath sounds normal  She has no wheezes  Crackles left lung base She exhibits no tenderness  Abdominal: Soft  She exhibits no distension  There is no tenderness  Musculoskeletal: She exhibits edema     Neurological: She is alert and oriented to person, place, and time    Skin: Skin is warm and dry  No rash noted  Lab Results:     Troponins:   Results from last 7 days  Lab Units 06/21/18  1055   TROPONIN I ng/mL <0 02       CBC with diff:   Results from last 7 days  Lab Units 06/21/18  1056   WBC Thousand/uL 8 20   HEMOGLOBIN g/dL 13 2   HEMATOCRIT % 40 5   MCV fL 98   PLATELETS Thousands/uL 276   MCH pg 31 9   MCHC g/dL 32 6   RDW % 12 8   MPV fL 8 6*   NRBC AUTO /100 WBCs 0       CMP:   Results from last 7 days  Lab Units 06/21/18  1057   SODIUM mmol/L 140   POTASSIUM mmol/L 3 4*   CHLORIDE mmol/L 100   CO2 mmol/L 30   ANION GAP mmol/L 10   BUN mg/dL 8   CREATININE mg/dL 0 84   GLUCOSE RANDOM mg/dL 143*   CALCIUM mg/dL 9 2   EGFR ml/min/1 73sq m 70       Magnesium:   Results from last 7 days  Lab Units 06/21/18  1057   MAGNESIUM mg/dL 1 7       Coags:       Lipid Profile:         Cardiac testing:   No results found for this or any previous visit  EKG: Personally reviewed: NSR with q waves inferiorly and poor R wave progression    Imaging: I have personally reviewed pertinent reports

## 2018-06-21 NOTE — PROGRESS NOTES
Patient seen in the preop holding area no complaints  Surgery discussed again consent signed  Left side marked  She was found on OR table EKg to have tachycardia and ST depressions  Surgery canceled  Patient taken to recovery room on monitor cardiac consult called   Will reschedule patient after cardiac evaluation and cleared in a few weeks

## 2018-06-21 NOTE — ADDENDUM NOTE
Addendum  created 06/21/18 1211 by Sandro Crowley MD    Diagnosis association updated, Order list changed, Visit diagnoses modified

## 2018-06-21 NOTE — PERIOPERATIVE NURSING NOTE
Patient received From [facility] after a cancelled surgery  Blood work drawn as ordered  EKG done  Awaiting Dr Tank Ayala to talk with a cardiologist  Patient assisted oob to BR without difficulty

## 2018-06-21 NOTE — ADDENDUM NOTE
Addendum  created 06/21/18 1703 by Annamaria Saba MD    Anesthesia Event edited, Anesthesia Review and Sign - Ready for Procedure, Anesthesia Staff edited, Sign clinical note

## 2018-06-21 NOTE — ANESTHESIA POST-OP FOLLOW-UP NOTE
Patient: Nonda Slingerlands  Procedure(s):  CYSTOSCOPY, URETEROSCOPY, LASER LITHO , STENT PLACEMENT  Vitals:    06/21/18 1100   BP: 137/84   Pulse: 100   Resp: 20   SpO2: 95%         Patient interviewed in [place]  SShe denies any chest pain or shortness of breath  Patient states will continue to smoke and would like to go home  Cardiology is up for evaluation and their help will be great appreciated as I feel the patient is tra high risk of having a cardiac event

## 2018-06-28 PROBLEM — N94.89 ENDOMETRIAL MASS: Status: ACTIVE | Noted: 2018-01-01

## 2018-06-28 PROBLEM — I51.9 CARDIOPATHY: Status: ACTIVE | Noted: 2018-01-01

## 2018-06-28 PROBLEM — F17.200 SMOKING: Status: ACTIVE | Noted: 2018-01-01

## 2018-06-28 PROBLEM — R97.1 ELEVATED CA-125: Status: ACTIVE | Noted: 2018-01-01

## 2018-06-28 PROBLEM — I87.2 CHRONIC VENOUS INSUFFICIENCY: Status: ACTIVE | Noted: 2018-01-01

## 2018-06-28 PROBLEM — I89.1 LYMPHANGITIS: Status: ACTIVE | Noted: 2018-01-01

## 2018-06-28 PROBLEM — N20.0 NEPHROLITHIASIS: Status: ACTIVE | Noted: 2018-01-01

## 2018-06-28 NOTE — ASSESSMENT & PLAN NOTE
-  Incidentally diagnosed on CT scan  Patient pattern of pain does not suggest these have been symptomatic  Calculi are nonobstructive  At this time given acute concern for gynecologic malignancy, I have recommended to not undergo any additional workup or intervention for her kidney stones  She will be referred back to Urology after interventions for potential gynecologic malignancy are completed

## 2018-06-28 NOTE — ASSESSMENT & PLAN NOTE
-   Patient counseled extensively about multiple health benefits of smoking cessation specially given Cardiology and current concern for gynecologic malignancy  I urged the patient to quit smoking immediately  External resources offered as needed

## 2018-06-28 NOTE — PATIENT INSTRUCTIONS
-   Surgical instructions as per surgical packet  -   Keep appointment with cardiology for cardiac clearance  Contact us if they have any concerns about you proceeding with this moderate risk surgery

## 2018-06-28 NOTE — ASSESSMENT & PLAN NOTE
-  Superimposed to atrophic skin changes from her chronic venous insufficiency there is evidence of blanching erythema proximally in her left distal lower extremity  I will prescribe doxycycline 100 mg p o  b i d  to complete a 10 day treatment course

## 2018-06-28 NOTE — ASSESSMENT & PLAN NOTE
-   Given postmenopausal bleeding and elevated  this is concerning for potential malignancy  I discussed with patient differential diagnosis including borderline and malignant pathology  Endometrial biopsy was obtained today  We will follow up on results  Regardless of result, I have recommended surgical intervention  Patient will 1st have to undergo cardiac clearance  If cleared, I have recommended and she has consented to proceed with possible robotic versus open hysterectomy, bilateral salpingo-oophorectomy, possible staging and all other indicated procedures  Patient underwent noninvasive cardiac testing and cardiology clearance is pending  I will order basic laboratory testing  She has recent EKG  I will order chest x-ray   noted and elevated  Will obtain hemoglobin A1c as per surgical site infection reduction protocol  I discussed with patient indication, risks, benefits and alternatives of surgical exploration  We discussed possibility of bleeding requiring blood transfusion, life-threatening infections requiring additional procedures, injuries to surrounding organs such as bladder, ureters, gastrointestinal tract and or neurovascular structures  Additionally, we discussed general risks associated to stress of surgery such as venous thromboembolism, acute myocardial events and stroke  All questions answered to patient's satisfaction  She agrees and wants to proceed  Informed consent form signed

## 2018-06-28 NOTE — ASSESSMENT & PLAN NOTE
-   Longstanding  Chronic lymphedema  Patient uses inconsistently lower extremity pressure stockings  Given this, morbid obesity and concern for malignancy she would benefit from prolonged perioperative prophylaxis with Lovenox to complete 28 days

## 2018-06-28 NOTE — LETTER
June 28, 2018     Searcy Sandifer, MD [facility]  [place] [id number]    Patient: Galileo King   YOB: 1945   Date of Visit: 6/28/2018       Dear Dr Amara Riley:    Thank you for referring Galileo King to me for evaluation  Below are my notes for this consultation  If you have questions, please do not hesitate to call me  I look forward to following your patient along with you  Sincerely,        Sherrie Llanes MD        CC:  MD Rickie Flores MD

## 2018-06-28 NOTE — H&P
Assessment/Plan:    Problem List Items Addressed This Visit        Cardiovascular and Mediastinum    Cardiopathy     -   Patient with limited exercise tolerance and recent acute EKG changes noted at the time of surgical intervention for kidney stones  Procedure was aborted  Noninvasive cardiac testing completed yesterday  Results pending  Patient will be scheduled to see Cardiology for preoperative clearance and management recommendations  Chronic venous insufficiency     -   Longstanding  Chronic lymphedema  Patient uses inconsistently lower extremity pressure stockings  Given this, morbid obesity and concern for malignancy she would benefit from prolonged perioperative prophylaxis with Lovenox to complete 28 days  Immune and Lymphatic    Lymphangitis     -  Superimposed to atrophic skin changes from her chronic venous insufficiency there is evidence of blanching erythema proximally in her left distal lower extremity  I will prescribe doxycycline 100 mg p o  b i d  to complete a 10 day treatment course  Relevant Medications    doxycycline monohydrate (MONODOX) 100 mg capsule       Genitourinary    Endometrial mass - Primary     -   Given postmenopausal bleeding and elevated  this is concerning for potential malignancy  I discussed with patient differential diagnosis including borderline and malignant pathology  Endometrial biopsy was obtained today  We will follow up on results  Regardless of result, I have recommended surgical intervention  Patient will 1st have to undergo cardiac clearance  If cleared, I have recommended and she has consented to proceed with possible robotic versus open hysterectomy, bilateral salpingo-oophorectomy, possible staging and all other indicated procedures  Patient underwent noninvasive cardiac testing and cardiology clearance is pending  I will order basic laboratory testing  She has recent EKG  I will order chest x-ray    CA 125 noted and elevated  Will obtain hemoglobin A1c as per surgical site infection reduction protocol  I discussed with patient indication, risks, benefits and alternatives of surgical exploration  We discussed possibility of bleeding requiring blood transfusion, life-threatening infections requiring additional procedures, injuries to surrounding organs such as bladder, ureters, gastrointestinal tract and or neurovascular structures  Additionally, we discussed general risks associated to stress of surgery such as venous thromboembolism, acute myocardial events and stroke  All questions answered to patient's satisfaction  She agrees and wants to proceed  Informed consent form signed  Relevant Orders    Case request operating room: HYSTERECTOMY LAPAROSCOPIC TOTAL ([address]) W/ ROBOTICS:   POSSIBLE ROBOTIC VERSUS OPEN HYSTERECTOMY, BILATERAL SALPINGO-OOPHORECTOMY, POSSIBLE STAGING AND ALL OTHER INDICATED PROCEDURES  (Completed)    Ambulatory referral to Cardiology    Comprehensive metabolic panel    CBC and differential    Type and screen    HEMOGLOBIN A1C W/ EAG ESTIMATION    XR chest pa & lateral    Nephrolithiasis     -  Incidentally diagnosed on CT scan  Patient pattern of pain does not suggest these have been symptomatic  Calculi are nonobstructive  At this time given acute concern for gynecologic malignancy, I have recommended to not undergo any additional workup or intervention for her kidney stones  She will be referred back to Urology after interventions for potential gynecologic malignancy are completed              Other    Elevated CA-125    Relevant Orders    Case request operating room: HYSTERECTOMY LAPAROSCOPIC TOTAL ([address]) W/ ROBOTICS:   POSSIBLE ROBOTIC VERSUS OPEN HYSTERECTOMY, BILATERAL SALPINGO-OOPHORECTOMY, POSSIBLE STAGING AND ALL OTHER INDICATED PROCEDURES  (Completed)    Smoking     -   Patient counseled extensively about multiple health benefits of smoking cessation specially given Cardiology and current concern for gynecologic malignancy  I urged the patient to quit smoking immediately  External resources offered as needed  CHIEF COMPLAINT:      Patient here for consultation, evaluation treatment of suspected gynecologic malignancy  Patient ID: Miley Henriquez is a 68 y o  female  HPI    60-year-old white morbidly obese female with history of chronic venous insufficiency, diabetes, recently diagnosed heart disease, gastroesophageal reflux disease, nephrolithiasis was in her usual state of health until the winter of 2017  At that time, she started experiencing heartburn  These improved somewhat with over-the-counter antacid  However, by April she reported to her primary care provider epigastric and pelvic constant pain  Describes her pain as soreness  Not radiated  Localized to the epigastrium and mid pelvic region  A CT scan was ordered which demonstrated incidental kidney stones, enlarged uterus with thickened endometrium, ascites  Patient was referred to Urology and Gynecology  Gynecologist ordered ultrasound which demonstrated thickened endometrium measuring 8 mm, concern for polyp and lateral mass suggestive of leiomyoma versus adnexal lesion  Her  was ordered and noted to be elevated  at 128 units/milliliter  She was referred to us for consultation and management  Reports postmenopausal bleeding which she describes as on and off spotting for the last 2 months  Of note, from the urologic standpoint she was taken to the operating room recently with plans to manage and the urological management of kidney stones  At the time, she was noted to have high blood pressure during mobilization from the operating room table and changes in her EKG  Procedure was aborted  She was referred to Cardiology  Noninvasive cardiac testing was done yesterday  Results are pending  She is not symptomatic from the urologic standpoint    Denies hematuria  Review of Systems    Her 12 point review of systems as above, otherwise unremarkable  Current Outpatient Prescriptions   Medication Sig Dispense Refill    amLODIPine (NORVASC) 5 mg tablet Take 5 mg by mouth every morning      ciprofloxacin (CIPRO) 750 mg tablet take 1 tablet by mouth twice a day for 10 days  0    esomeprazole (NexIUM) 40 MG capsule   0    furosemide (LASIX) 80 mg tablet Take 80 mg by mouth daily      glucosamine-chondroitin 500-400 MG tablet Take 1 tablet by mouth 2 (two) times a day        losartan (COZAAR) 100 MG tablet Take 100 mg by mouth every morning      metFORMIN (GLUCOPHAGE) 500 mg tablet Take 500 mg by mouth 2 (two) times a day with meals      potassium chloride (KLOR-CON) 20 mEq packet Take 20 mEq by mouth daily      pregabalin (LYRICA) 200 MG capsule Take 200 mg by mouth 2 (two) times a day      Probiotic Product (PROBIOTIC-10 ULTIMATE) CAPS Take by mouth      doxycycline monohydrate (MONODOX) 100 mg capsule Take 1 capsule (100 mg total) by mouth 2 (two) times a day for 10 days 20 capsule 0     No current facility-administered medications for this visit  Allergies   Allergen Reactions    Pentazocine Abdominal Pain     Reaction Date: [id number];     Sulfa Antibiotics Hives       Past Medical History:   Diagnosis Date    Diabetes mellitus ([address] )     type 2    Fever     low grade for over a month     Fibromyalgia, primary     Hypertension     Kidney disorder     Mass     ovary or uterus    Stomach ache     UTI (urinary tract infection)     Dx on 06/11/18 per the pt post urologist call    Venous insufficiency        Past Surgical History:   Procedure Laterality Date    CATARACT EXTRACTION     [address]    lap    COLONOSCOPY N/A 6/13/2018    Procedure: COLONOSCOPY;  Surgeon: Elsy Nieves MD;  Location: [facility] GI [facility];   Service: Gastroenterology    KNEE ARTHROSCOPY Left     x5    KNEE ARTHROSCOPY Right     x2    MN EGD TRANSORAL BIOPSY SINGLE/MULTIPLE N/A 2018    Procedure: ESOPHAGOGASTRODUODENOSCOPY (EGD); Surgeon: Tanja Ricardo MD;  Location: [facility] GI LAB; Service: Gastroenterology       OB History      Para Term  AB Living    4 3            SAB TAB Ectopic Multiple Live Births                       Family History   Problem Relation Age of Onset   Kale Patterson Cancer Mother         breast age 48   Kale Patterson Early death Mother     No Known Problems Daughter     No Known Problems Son     No Known Problems Son        The following portions of the patient's history were reviewed and updated as appropriate: allergies, current medications, past family history, past medical history, past social history, past surgical history and problem list       Objective:    Blood pressure 140/84, pulse (!) 106, temperature 98 7 °F (37 1 °C), temperature source Oral, resp  rate 20, height 5' 6 5" (1 689 m), weight 109 kg (241 lb 4 8 oz)  Body mass index is 38 36 kg/m²  Physical Exam   Constitutional: She is oriented to person, place, and time  She appears well-developed and well-nourished  HENT:   Head: Normocephalic and atraumatic  Neck: Normal range of motion  Neck supple  No thyromegaly present  Cardiovascular: Normal rate and regular rhythm  No murmur heard  Pulmonary/Chest: Effort normal and breath sounds normal  No respiratory distress  She has no wheezes  She has no rales  Abdominal: Soft  She exhibits no distension and no mass  There is no rebound  Genitourinary:   Genitourinary Comments:   Normal external genitalia  No vulvar lesions  Vulva and vaginal atrophy noted  Posterior vaginal agglutination and fibrosis without discrete intraluminal tumor  Cervix markedly displaced anteriorly  Endometrial biopsy obtained  Pipelle advanced easily to 10 cm and abundant amount of tissue obtained and sent for surgical pathology  Patient tolerated well  Bimanual exam is limited by body habitus  The uterus appears enlarged    I cannot appreciate definitive adnexal masses  Musculoskeletal: Normal range of motion  She exhibits no edema  Lymphadenopathy:     She has no cervical adenopathy  Neurological: She is alert and oriented to person, place, and time  Skin: Skin is warm and dry  No rash noted  Psychiatric: She has a normal mood and affect  Her behavior is normal    Vitals reviewed   as per HPI  Lab Results   Component Value Date    WBC 8 20 06/21/2018    HGB 13 2 06/21/2018    HCT 40 5 06/21/2018    MCV 98 06/21/2018     06/21/2018     Lab Results   Component Value Date     06/21/2018    K 3 4 (L) 06/21/2018     06/21/2018    CO2 30 06/21/2018    ANIONGAP 10 06/21/2018    BUN 8 06/21/2018    CREATININE 0 84 06/21/2018    GLUCOSE 143 (H) 06/21/2018    GLUF 143 (H) 06/21/2018    CALCIUM 9 2 06/21/2018    EGFR 70 06/21/2018 6/4/2018 Study Result     CT ABDOMEN AND PELVIS WITH IV CONTRAST     INDICATION:   R10 9: Unspecified abdominal pain  R10 819: Abdominal tenderness, unspecified site  R50 9: Fever, unspecified  Fever, abdominal pain, bloating        COMPARISON: None     TECHNIQUE:  CT examination of the abdomen and pelvis was performed  Axial, sagittal, and coronal 2D reformatted images were created from the source data and submitted for interpretation      Radiation dose length product (DLP) for this visit:  1342 26 mGy-cm     This examination, like all CT scans performed in the [facility], was performed utilizing techniques to minimize radiation dose exposure, including the use of   iterative reconstruction and automated exposure control      IV Contrast:  100 mL of iohexol (OMNIPAQUE)  350  Enteric Contrast:  Enteric contrast was administered      FINDINGS:     ABDOMEN     LOWER CHEST:  Moderate coronary atherosclerosis, otherwise no clinically significant abnormality identified in the visualized lower chest      LIVER/BILIARY TREE:  Unremarkable      GALLBLADDER: Status post cholecystectomy  Mild prominence of the biliary tree is noted, probably secondary to postcholecystectomy state      SPLEEN:  Unremarkable      PANCREAS:  Unremarkable      ADRENAL GLANDS:  2 4 x 1 7 cm right adrenal nodule, nonspecific  Some mild thickening of the left adrenal gland is noted  Otherwise unremarkable      KIDNEYS/URETERS:  Some mild renal cortical scarring is suspected on the right  There is a large calculus within the inferior pole calyx measuring approximately 1 5 x 1 0 cm in size  The right kidney otherwise appears grossly unremarkable with the   exception of several tiny scattered nonobstructing calculi; no hydroureteronephrosis      Approximately 10 nonobstructing calculi are seen in the left kidney, the largest of which measures approximately 7 mm in size  There is mild left hydroureteronephrosis but no discrete obstructing stone is identified  The left kidney otherwise appears   unremarkable      STOMACH AND BOWEL:  The appendix is not clearly identified  No evidence of bowel obstruction  Otherwise unremarkable      APPENDIX:  No discrete evidence of acute appendicitis      ABDOMINOPELVIC CAVITY:  Small volume ascites is seen  Some stranding and nodular thickening of the anterior mesenteric fat is seen in the upper and mid abdomen (for example, axial images 37 through 52)  No intraperitoneal free air      VESSELS:  The aorta is tortuous  There is mild to moderate scattered atherosclerotic disease  No aortic aneurysm      PELVIS     REPRODUCTIVE ORGANS:  Heterogeneous appearance of the myometrium and thickening of the endometrial stripe, measuring approximately 8 mm in thickness  There is a complex appearing lesion along the posterior left uterine fundus which measures   approximately 5 5 x 4 7 x 3 6 cm in size, this may be within the left ovary    Several small focal calcified fibroids are seen in the right uterine fundus      URINARY BLADDER: Unremarkable      ABDOMINAL WALL/INGUINAL REGIONS:  Mild body wall edema  The anterior abdominal wall is intact      OSSEOUS STRUCTURES:  Degenerative changes are seen at L4/L5 and L5/S1 with vacuum disc phenomenon  There are mild degenerative changes of the left hip  No acute fracture is seen  No suspicious appearing osseous lesions are identified      IMPRESSION:     Heterogeneous appearance of the myometrium with thickening of the endometrial stripe  There is a complex appearing lesion along the posterior left uterine fundus measuring approximately 5 5 x 4 7 x 3 6 cm in size      In addition, small volume ascites is seen  Some stranding and nodular thickening of the anterior mesenteric fat is seen in the upper and mid abdomen (for example, axial images 37 through 52)  Given the findings taken together, the diagnosis of   exclusion is neoplasm which could be uterine or ovarian with associated peritoneal metastatic disease  Correlation with the patient's symptoms and laboratory values recommended  Pelvic ultrasound and/or pelvic MR with and without contrast may be of   benefit for further evaluation      Bilateral nephrolithiasis  Mild hydroureteronephrosis on the left which could be related to ureteral extrinsic compression or ureteral involvement in the left pelvis (axial image 66)     Right adrenal nodule, left adrenal thickening, body wall edema, and other findings as above      Findings to be called to the referring caregiver's office by the radiology reading room liaison      Workstation performed: [id number]       6/25/2018 Study Result     PELVIC ULTRASOUND, COMPLETE     INDICATION:  68years old   R22 9: Localized swelling, mass and lump, unspecified      COMPARISON: CT on 06/04/2018     TECHNIQUE:   Transabdominal pelvic ultrasound was performed in sagittal and transverse planes with a curvilinear transducer    Additional transvaginal imaging was performed to better evaluate the endometrium and ovaries  Imaging included volumetric   sweeps as well as traditional still imaging technique      FINDINGS:     UTERUS:  The uterus is normal in position, measuring 10 8 x 5 7 8 9 cm  Contour and echotexture appears heterogeneous  Adjacent to the left side of the uterus is a mass measuring 5 9 x 3 3 x 2 8 cm  This could be uterine in origin although I cannot exclude this originating from the left ovary  The cervix shows no   suspicious abnormality      ENDOMETRIUM:    There is fluid in the endometrial canal   There is an apparent 8 mm endometrial polyp present  The endometrium is thickened measuring 8 mm      OVARIES/ADNEXA:  Right ovary: Not visualized        Left ovary: Mass arising from the uterus versus the left ovary described above        There is pelvic ascites present      IMPRESSION:        1   Enlarged heterogeneous uterus  2   Thickened endometrium with at least one endometrial polyp  3   Mass arising from the posterior uterus on the left versus a left adnexal mass  4   Pelvic ascites  Findings are concerning for pelvic malignancy either arising from the uterus or the left ovary  Surgical evaluation recommended    If clinically appropriate, pelvic MRI may provide additional information                     Workstation performed: [id number]         Teresita Zuleta MD, [address]  6/28/2018  10:17 AM

## 2018-06-28 NOTE — ASSESSMENT & PLAN NOTE
-   Patient with limited exercise tolerance and recent acute EKG changes noted at the time of surgical intervention for kidney stones  Procedure was aborted  Noninvasive cardiac testing completed yesterday  Results pending  Patient will be scheduled to see Cardiology for preoperative clearance and management recommendations

## 2018-07-03 PROBLEM — Z01.818 PREOP EXAMINATION: Status: ACTIVE | Noted: 2018-01-01

## 2018-07-03 PROBLEM — R60.1 GENERALIZED EDEMA: Status: ACTIVE | Noted: 2018-01-01

## 2018-07-03 NOTE — LETTER
July 3, 2018     Lilia Norman MD  [address] [address]    Patient: Galileo King   YOB: 1945   Date of Visit: 7/3/2018       Dear Dr Princess Jeffers:    Thank you for referring Galileo King to me for evaluation  Below are my notes for this consultation  If you have questions, please do not hesitate to call me  I look forward to following your patient along with you  Sincerely,        No Perry DO        CC: No Recipients  No Perry DO  7/3/2018  6:14 PM  Sign at close encounter   Subjective: Galileo King is a 68 y o  female who presents to the office today for a preoperative consultation at the request of surgeon Dr Alfredo Menchaca who plans on performing hysterectomy on July 26  This consultation is requested for the specific conditions prompting preoperative evaluation (i e  because of potential affect on operative risk): exertional dyspnea  Planned anesthesia is general  The patient  has no known anesthesia issues  She has dyspnea with minimal exertion  She can walk 20-30 feet prior to stopping due to dyspnea and knee pain  She was undergoing cystoscopy and developed dyspnea  Stress test was ordered which was normal   Echocardiogram was ordered but not done  The following portions of the patient's history were reviewed and updated as appropriate:   She  has a past medical history of Diabetes mellitus ([address] ); Fever; Fibromyalgia, primary; Hypertension; Kidney disorder; Mass; Stomach ache; UTI (urinary tract infection); and Venous insufficiency  She  has a past surgical history that includes Cholecystectomy (1995); Knee arthroscopy (Left); Knee arthroscopy (Right); pr egd transoral biopsy single/multiple (N/A, 6/13/2018); Colonoscopy (N/A, 6/13/2018); and Cataract extraction  Her family history includes Cancer in her mother; Early death in her mother; No Known Problems in her daughter, son, and son    She  reports that she has been smoking Cigarettes  She has a 100 00 pack-year smoking history  She has never used smokeless tobacco  She reports that she drinks about 1 2 oz of alcohol per week   She reports that she does not use drugs  Current Outpatient Prescriptions   Medication Sig Dispense Refill    amLODIPine (NORVASC) 5 mg tablet Take 5 mg by mouth every morning      doxycycline monohydrate (MONODOX) 100 mg capsule Take 1 capsule (100 mg total) by mouth 2 (two) times a day for 10 days 20 capsule 0    esomeprazole (NexIUM) 40 MG capsule   0    furosemide (LASIX) 80 mg tablet Take 80 mg by mouth daily      glucosamine-chondroitin 500-400 MG tablet Take 1 tablet by mouth 2 (two) times a day        losartan (COZAAR) 100 MG tablet Take 100 mg by mouth every morning      metFORMIN (GLUCOPHAGE) 500 mg tablet Take 500 mg by mouth 2 (two) times a day with meals      potassium chloride (KLOR-CON) 20 mEq packet Take 20 mEq by mouth daily      pregabalin (LYRICA) 200 MG capsule Take 200 mg by mouth 2 (two) times a day      Probiotic Product (PROBIOTIC-10 ULTIMATE) CAPS Take by mouth      ciprofloxacin (CIPRO) 750 mg tablet take 1 tablet by mouth twice a day for 10 days  0    varenicline (CHANTIX ROLO) 0 5 MG X 11 & 1 MG X 42 tablet Take one 0 5mg tab by mouth 1x daily for 3 days, then increase to one 0 5mg tab 2x daily for 3 days, then increase to one 1mg tab 2x daily 53 tablet 0     No current facility-administered medications for this visit  She is allergic to sulfa antibiotics       Review of Systems    Review of Systems   Constitutional: Negative for chills, fatigue and fever  HENT: Negative for congestion, nosebleeds and postnasal drip  Respiratory: Positive for shortness of breath  Negative for cough and chest tightness  Cardiovascular: Positive for leg swelling  Negative for chest pain and palpitations  Gastrointestinal: Negative for abdominal distention, abdominal pain, diarrhea, nausea and vomiting     Endocrine: Negative for polydipsia, polyphagia and polyuria  Musculoskeletal: Negative for gait problem and myalgias  Skin: Negative for color change, pallor and rash  Allergic/Immunologic: Negative for environmental allergies, food allergies and immunocompromised state  Neurological: Negative for dizziness, seizures, syncope and light-headedness  Hematological: Negative for adenopathy  Does not bruise/bleed easily  Psychiatric/Behavioral: Negative for dysphoric mood  The patient is not nervous/anxious  Objective:      Physical Exam  /86 (BP Location: Right arm, Patient Position: Sitting, Cuff Size: Large)   Pulse (!) 115   Wt 109 kg (240 lb 3 2 oz)   SpO2 94%   BMI 38 19 kg/m²    Physical Exam   Constitutional: She appears healthy  No distress  HENT:   Nose: Nose normal    Mouth/Throat: Oropharynx is clear  Eyes: Conjunctivae are normal  Pupils are equal, round, and reactive to light  Neck: Neck supple  No JVD present  Cardiovascular: Normal rate and regular rhythm  Exam reveals no distant heart sounds and no friction rub  Murmur heard  Pulmonary/Chest: Effort normal and breath sounds normal  She has no wheezes  She has no rales  Abdominal: Soft  She exhibits no distension  There is no tenderness  Musculoskeletal: She exhibits edema  Neurological: She is alert and oriented to person, place, and time  Skin: Skin is warm and dry  No rash noted         Cardiographics  ECG: sinus tachycardia with low voltage and poor R wave progression  Echocardiogram: not done    Lab Review   Lab Results   Component Value Date     06/21/2018    K 3 4 (L) 06/21/2018     06/21/2018    CO2 30 06/21/2018    BUN 8 06/21/2018    CREATININE 0 84 06/21/2018    GLUCOSE 143 (H) 06/21/2018    CALCIUM 9 2 06/21/2018     Lab Results   Component Value Date    WBC 8 20 06/21/2018    HGB 13 2 06/21/2018    HCT 40 5 06/21/2018    MCV 98 06/21/2018     06/21/2018     No results found for: CHOL, TRIG, HDL, LDLDIRECT      Assessment:     1  Preop examination    2  Endometrial mass    3  Shortness of breath    4  Tobacco abuse    5  Smoking    6  Chronic venous insufficiency       68 y o  female with planned surgery as above  She has shortness of breath with minimal exertion  Stress test was normal    Will obtain Chest Xray and 2D echocardiogram         Plan:      1  Preoperative workup as follows echocardiography to exclude impaired ventricular function, chest x-ray  2  Change in medication regimen before surgery: none, continue medication regimen including morning of surgery, with sip of water  3  Prophylaxis for cardiac events with perioperative beta-blockers: not indicated  4  Invasive hemodynamic monitoring perioperatively: not indicated  5  Deep vein thrombosis prophylaxis postoperatively:regimen to be chosen by surgical team   6  Continue losartan  Hold morning of surgery  7  Continue furosemide  8  Discussed smoking cessation in detail  Will begin Chantix  Instructions on usage discussed with patient in detail  Total time spent was 15 minutes counseling

## 2018-07-03 NOTE — PROGRESS NOTES
Subjective: Manuel Urena is a 68 y o  female who presents to the office today for a preoperative consultation at the request of surgeon Dr Kristine Lizarraga who plans on performing hysterectomy on July 26  This consultation is requested for the specific conditions prompting preoperative evaluation (i e  because of potential affect on operative risk): exertional dyspnea  Planned anesthesia is general  The patient  has no known anesthesia issues  She has dyspnea with minimal exertion  She can walk 20-30 feet prior to stopping due to dyspnea and knee pain  She was undergoing cystoscopy and developed dyspnea  Stress test was ordered which was normal   Echocardiogram was ordered but not done  The following portions of the patient's history were reviewed and updated as appropriate:   She  has a past medical history of Diabetes mellitus ([address] ); Fever; Fibromyalgia, primary; Hypertension; Kidney disorder; Mass; Stomach ache; UTI (urinary tract infection); and Venous insufficiency  She  has a past surgical history that includes Cholecystectomy (1995); Knee arthroscopy (Left); Knee arthroscopy (Right); pr egd transoral biopsy single/multiple (N/A, 6/13/2018); Colonoscopy (N/A, 6/13/2018); and Cataract extraction  Her family history includes Cancer in her mother; Early death in her mother; No Known Problems in her daughter, son, and son  She  reports that she has been smoking Cigarettes  She has a 100 00 pack-year smoking history  She has never used smokeless tobacco  She reports that she drinks about 1 2 oz of alcohol per week   She reports that she does not use drugs    Current Outpatient Prescriptions   Medication Sig Dispense Refill    amLODIPine (NORVASC) 5 mg tablet Take 5 mg by mouth every morning      doxycycline monohydrate (MONODOX) 100 mg capsule Take 1 capsule (100 mg total) by mouth 2 (two) times a day for 10 days 20 capsule 0    esomeprazole (NexIUM) 40 MG capsule   0    furosemide (LASIX) 80 mg tablet Take 80 mg by mouth daily      glucosamine-chondroitin 500-400 MG tablet Take 1 tablet by mouth 2 (two) times a day        losartan (COZAAR) 100 MG tablet Take 100 mg by mouth every morning      metFORMIN (GLUCOPHAGE) 500 mg tablet Take 500 mg by mouth 2 (two) times a day with meals      potassium chloride (KLOR-CON) 20 mEq packet Take 20 mEq by mouth daily      pregabalin (LYRICA) 200 MG capsule Take 200 mg by mouth 2 (two) times a day      Probiotic Product (PROBIOTIC-10 ULTIMATE) CAPS Take by mouth      ciprofloxacin (CIPRO) 750 mg tablet take 1 tablet by mouth twice a day for 10 days  0    varenicline (CHANTIX ROLO) 0 5 MG X 11 & 1 MG X 42 tablet Take one 0 5mg tab by mouth 1x daily for 3 days, then increase to one 0 5mg tab 2x daily for 3 days, then increase to one 1mg tab 2x daily 53 tablet 0     No current facility-administered medications for this visit  She is allergic to sulfa antibiotics       Review of Systems    Review of Systems   Constitutional: Negative for chills, fatigue and fever  HENT: Negative for congestion, nosebleeds and postnasal drip  Respiratory: Positive for shortness of breath  Negative for cough and chest tightness  Cardiovascular: Positive for leg swelling  Negative for chest pain and palpitations  Gastrointestinal: Negative for abdominal distention, abdominal pain, diarrhea, nausea and vomiting  Endocrine: Negative for polydipsia, polyphagia and polyuria  Musculoskeletal: Negative for gait problem and myalgias  Skin: Negative for color change, pallor and rash  Allergic/Immunologic: Negative for environmental allergies, food allergies and immunocompromised state  Neurological: Negative for dizziness, seizures, syncope and light-headedness  Hematological: Negative for adenopathy  Does not bruise/bleed easily  Psychiatric/Behavioral: Negative for dysphoric mood  The patient is not nervous/anxious            Objective:      Physical Exam  /86 (BP Location: Right arm, Patient Position: Sitting, Cuff Size: Large)   Pulse (!) 115   Wt 109 kg (240 lb 3 2 oz)   SpO2 94%   BMI 38 19 kg/m²   Physical Exam   Constitutional: She appears healthy  No distress  HENT:   Nose: Nose normal    Mouth/Throat: Oropharynx is clear  Eyes: Conjunctivae are normal  Pupils are equal, round, and reactive to light  Neck: Neck supple  No JVD present  Cardiovascular: Normal rate and regular rhythm  Exam reveals no distant heart sounds and no friction rub  Murmur heard  Pulmonary/Chest: Effort normal and breath sounds normal  She has no wheezes  She has no rales  Abdominal: Soft  She exhibits no distension  There is no tenderness  Musculoskeletal: She exhibits edema  Neurological: She is alert and oriented to person, place, and time  Skin: Skin is warm and dry  No rash noted  Cardiographics  ECG: sinus tachycardia with low voltage and poor R wave progression  Echocardiogram: not done    Lab Review   Lab Results   Component Value Date     06/21/2018    K 3 4 (L) 06/21/2018     06/21/2018    CO2 30 06/21/2018    BUN 8 06/21/2018    CREATININE 0 84 06/21/2018    GLUCOSE 143 (H) 06/21/2018    CALCIUM 9 2 06/21/2018     Lab Results   Component Value Date    WBC 8 20 06/21/2018    HGB 13 2 06/21/2018    HCT 40 5 06/21/2018    MCV 98 06/21/2018     06/21/2018     No results found for: CHOL, TRIG, HDL, LDLDIRECT      Assessment:     1  Preop examination    2  Endometrial mass    3  Shortness of breath    4  Tobacco abuse    5  Smoking    6  Chronic venous insufficiency       68 y o  female with planned surgery as above  She has shortness of breath with minimal exertion  Stress test was normal    Will obtain Chest Xray and 2D echocardiogram         Plan:      1  Preoperative workup as follows echocardiography to exclude impaired ventricular function, chest x-ray    2  Change in medication regimen before surgery: none, continue medication regimen including morning of surgery, with sip of water  3  Prophylaxis for cardiac events with perioperative beta-blockers: not indicated  4  Invasive hemodynamic monitoring perioperatively: not indicated  5  Deep vein thrombosis prophylaxis postoperatively:regimen to be chosen by surgical team   6  Continue losartan  Hold morning of surgery  7  Continue furosemide  8  Discussed smoking cessation in detail  Will begin Chantix  Instructions on usage discussed with patient in detail  Total time spent was 15 minutes counseling

## 2018-07-10 NOTE — PRE-PROCEDURE INSTRUCTIONS
Pre-Surgery Instructions:   Medication Instructions    amLODIPine (NORVASC) 5 mg tablet Instructed patient per Anesthesia Guidelines   esomeprazole (NexIUM) 40 MG capsule Instructed patient per Anesthesia Guidelines   furosemide (LASIX) 80 mg tablet Instructed patient per Anesthesia Guidelines   glucosamine-chondroitin 500-400 MG tablet Instructed patient per Anesthesia Guidelines   losartan (COZAAR) 100 MG tablet Instructed patient per Anesthesia Guidelines   metFORMIN (GLUCOPHAGE) 500 mg tablet Instructed patient per Anesthesia Guidelines   potassium chloride (KLOR-CON) 20 mEq packet Instructed patient per Anesthesia Guidelines   pregabalin (LYRICA) 200 MG capsule Instructed patient per Anesthesia Guidelines   Probiotic Product (PROBIOTIC-10 ULTIMATE) CAPS Instructed patient per Anesthesia Guidelines   varenicline (CHANTIX ROLO) 0 5 MG X 11 & 1 MG X 42 tablet Instructed patient per Anesthesia Guidelines  Pt instructed to NOT take losartan day before and day of surgery  Pt instructed to NOT take lasix and metformin on day of surgery

## 2018-07-10 NOTE — PRE-PROCEDURE INSTRUCTIONS
Pre-Surgery Instructions:   Medication Instructions    amLODIPine (NORVASC) 5 mg tablet Instructed patient per Anesthesia Guidelines   esomeprazole (NexIUM) 40 MG capsule Instructed patient per Anesthesia Guidelines   furosemide (LASIX) 80 mg tablet Instructed patient per Anesthesia Guidelines   glucosamine-chondroitin 500-400 MG tablet Instructed patient per Anesthesia Guidelines   losartan (COZAAR) 100 MG tablet Instructed patient per Anesthesia Guidelines   metFORMIN (GLUCOPHAGE) 500 mg tablet Instructed patient per Anesthesia Guidelines   potassium chloride (KLOR-CON) 20 mEq packet Instructed patient per Anesthesia Guidelines   pregabalin (LYRICA) 200 MG capsule Instructed patient per Anesthesia Guidelines   Probiotic Product (PROBIOTIC-10 ULTIMATE) CAPS Instructed patient per Anesthesia Guidelines   varenicline (CHANTIX ROLO) 0 5 MG X 11 & 1 MG X 42 tablet Instructed patient per Anesthesia Guidelines

## 2018-07-19 NOTE — LETTER
Cardiology Pre Operative Clearance      PRE OPERATIVE CARDIAC RISK ASSESSMENT    07/20/18    Nonda Palisade  1945  [id number]    Date of Surgery: 07/27/2018    Type of Surgery: Hysterectomy     Surgeon: Nik Rose MD        Anticoagulation: None    Physician Comment: Ms Cleo Cortes is at low risk for planned procedure and may proceed as scheduled  Please see full office visit note for full details      Electronically Signed: Ian Pinto DO, [facility]

## 2018-07-23 NOTE — TELEPHONE ENCOUNTER
Patient with recurrent UTI's  Most recently treated with Levaquin x 5 days beginning on 7/9/2018  Called today with persistent symptoms and low-grade fever  Not amenable to CT renal protocol  Per Dr Waldo Dorsey, I extended Levaquin rx  She will have a cystoscopy at the time of her scheduled surgery on Thursday

## 2018-07-24 NOTE — TELEPHONE ENCOUNTER
----- Message from Sharad Elizabeth DO sent at 7/23/2018 11:58 AM EDT -----  Can you please let the patient know test was normal and they should follow up as we discussed

## 2018-07-24 NOTE — TELEPHONE ENCOUNTER
Spoke with pt about results and clearance; clerical will add pt to wait list for Jan 2019 - schedule with Dr Anastasiia Ku

## 2018-07-26 PROBLEM — C78.6 PERITONEAL CARCINOMATOSIS (HCC): Status: ACTIVE | Noted: 2018-01-01

## 2018-07-26 PROBLEM — I89.1 LYMPHANGITIS: Status: RESOLVED | Noted: 2018-01-01 | Resolved: 2018-01-01

## 2018-07-26 PROBLEM — Z98.890 S/P LAPAROSCOPIC PROCEDURE: Status: ACTIVE | Noted: 2018-01-01

## 2018-07-26 PROBLEM — Z87.440 HISTORY OF RECURRENT UTIS: Status: ACTIVE | Noted: 2018-01-01

## 2018-07-26 PROBLEM — Z01.818 PREOP EXAMINATION: Status: RESOLVED | Noted: 2018-01-01 | Resolved: 2018-01-01

## 2018-07-26 PROBLEM — R18.0 MALIGNANT ASCITES: Status: ACTIVE | Noted: 2018-01-01

## 2018-07-26 NOTE — ANESTHESIA PROCEDURE NOTES
Arterial Line Insertion  Date/Time: 7/26/2018 8:30 AM  Performed by: Claudette Lobe by: Pasquale Pop   Consent: Verbal consent obtained  Written consent obtained  Risks and benefits: risks, benefits and alternatives were discussed  Consent given by: patient  Patient understanding: patient states understanding of the procedure being performed  Patient consent: the patient's understanding of the procedure matches consent given  Procedure consent: procedure consent matches procedure scheduled  Preparation: Patient was prepped and draped in the usual sterile fashion  Indications: hemodynamic monitoring    Location: radial artery  Anesthesia method: GETA    Procedure Details:  Needle gauge: 20  Seldinger technique: Seldinger technique used  Number of attempts: 1    Post-procedure:  Post-procedure: dressing applied  Waveform: good waveform  Patient tolerance: Patient tolerated the procedure well with no immediate complications

## 2018-07-26 NOTE — PERIOPERATIVE NURSING NOTE
VSS, pt denies pain or nausea, assessment unchanged, report called, no questions, pt transferred to [facility]

## 2018-07-26 NOTE — DISCHARGE INSTRUCTIONS
Gynecology Discharge Instructions  1  No heavy lifting more than one full gallon of milk (about 8 lbs) for the first few days  2  Call the office for fever greater than 100 4, heavy vaginal bleeding or increasing pain  3   Activity as tolerated  4  You may take stairs one at a time, touching each step with both feet for the first few days, then as tolerated  5  Follow up with Dr Leanna Kanner in 1-2 weeks    histocryl to wound may shower DB  Advance diet to regular as tolerated per orders    Post Op Prescriptions  You may take Tylenol and Ibuprofen 600mg for pain  You may take the Ibuprofen every 6 hours to relieve pain, especially cramping and mild pain  You may also take Colace (Docusate Sodium) 100mg 2x daily  This is available over the counter and is recommended to help keep your stool soft in order to prevent straining, especially if you are taking narcotic pain medication       If you have any questions regarding your prescriptions please call your doctor

## 2018-07-26 NOTE — INTERVAL H&P NOTE
H&P reviewed  After examining the patient I find no changes in the patients condition since the H&P had been written  - Recurrent UTIs: Know nephroureterolithiasis without obstruction  Will benefit from urologic intervention after Gyn intervention for elevated Ca125 and postmenopausal bleeding completed  - Dyspnea on exertion: S/p stress test and ECHO without major abnormalities identified   Seen and cleared by cardiologist     Saeid Miguel MD, [id number] [address]  7/26/2018  6:42 AM

## 2018-07-26 NOTE — OP NOTE
OPERATIVE REPORT  PATIENT NAME: Zohreh Dale    :  1945  MRN: [id number]  Pt Location: [facility] OR ROOM 14    SURGERY DATE: 2018    Surgeon(s) and Role:     * Lucien Whitten MD - Primary     * Janett Wells PA-C - Assisting     * Isadora Gillis MD - Ebb Monday, MD - Assisting     * Nu Brown MD - Assisting     * Augustina Singer MD - Assisting    Preop Diagnosis:  Elevated CA-125 [R97 1]  Endometrial mass [N94 89]    Post-Op Diagnosis Codes:     * Elevated CA-125 [R97 1]     * Endometrial mass [N94 89]    Procedure(s) (LRB):  diagnostic laparoscopy, peritoneal biopsy  (N/A)    Specimen(s):  ID Type Source Tests Collected by Time Destination   1 :  Body Fluid Ascites NON-GYNECOLOGIC CYTOLOGY Lucien Whitten MD 2018 0292    2 : biopsy Tissue Peritoneum TISSUE EXAM Lucien Whitten MD 2018 1201    A :  Urine Urine, Indwelling Aldana Catheter URINE CULTURE Lucien Whitten MD 2018 2042        Estimated Blood Loss:   Minimal    Drains:  [REMOVED] Urethral Catheter Double-lumen;Non-latex 6 Fr  (Removed)   Number of days: 0       Anesthesia Type:   General    Operative Indications:  Patient with postmenopausal bleeding, pelvic mass and small volume ascites with elevated   After counseling, she elected to undergo definitive surgical intervention  Operative Findings:  1   1 8 L of straw-colored ascites drained  Sent for cytology  2   Diffuse peritoneal carcinomatosis with involvement of all peritoneal surfaces, anti mesenteric surface of small bowel, omental caking and tumor coating of pelvic organs consistent with 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  Peritoneal biopsy taken from grossly abnormal area within the anterior abdominal wall  Await final pathology results  3   Laparoscopic findings consistent with stage IIIC disease      Complications:   None    Procedure and Technique:  The patient was taken to the operating room her general endotracheal anesthesia was induced without complications  IV fluids were running  Antibiotics were administered anticipation of possible hysterectomy  The patient had sequential compression devices applied to lower extremities and activated prior to induction of anesthesia  Single dose of preoperative Lovenox was administered  The patient was placed in dorsal lithotomy position on Efren stirrups and her abdomen perineum and vagina were prepped and draped in the usual sterile fashion  Aldana catheter was placed  A 5 mm transverse skin incision was made approximately 5 cm above the umbilicus in the midline  Under direct visualization the peritoneal cavity was entered using a 5 mm Endopath trocar  Good intraperitoneal location was confirmed and pneumoperitoneum was allowed to maximal pressure of 15 mm of mercury  The above-mentioned findings were noted  Additional 5 mm trocar was placed in the left upper quadrant under direct visualization  Approximately 60 mL of ascites were collected and sent for cytology  Then, the rest of the fluid was drained for comfort  At this point I thickened area of carcinomatosis along the anterior abdominal wall was identified  The peritoneum this area was sharply dissected off the underlying layers using Endo Tra  Then, the specimen was removed through the left upper quadrant port site which was slightly enlarged to allow delivery without difficulties  Pneumoperitoneum was completely released with the assistance of Valsalva maneuvers  Port sites were cleansed and closed in 2 layers with 2 0 Vicryl and 4 0 Monocryl  Histoacryl was applied  The patient tolerated the procedure well  Sponge, needle and instrument counts reports correct x2  Radiofrequency survey demonstrated no retained sponges or gauzes  The patient was successfully extubated in the operating room and transferred to the postanesthetic care unit in stable condition       I was present for the entire procedure    Patient Disposition:  PACU     SIGNATURE: Debo Carpio MD, Dahlia May  DATE: July 26, 2018  TIME: 9:27 AM

## 2018-07-26 NOTE — H&P (VIEW-ONLY)
Assessment/Plan:    Problem List Items Addressed This Visit        Cardiovascular and Mediastinum    Cardiopathy     -   Patient with limited exercise tolerance and recent acute EKG changes noted at the time of surgical intervention for kidney stones  Procedure was aborted  Noninvasive cardiac testing completed yesterday  Results pending  Patient will be scheduled to see Cardiology for preoperative clearance and management recommendations  Chronic venous insufficiency     -   Longstanding  Chronic lymphedema  Patient uses inconsistently lower extremity pressure stockings  Given this, morbid obesity and concern for malignancy she would benefit from prolonged perioperative prophylaxis with Lovenox to complete 28 days  Immune and Lymphatic    Lymphangitis     -  Superimposed to atrophic skin changes from her chronic venous insufficiency there is evidence of blanching erythema proximally in her left distal lower extremity  I will prescribe doxycycline 100 mg p o  b i d  to complete a 10 day treatment course  Relevant Medications    doxycycline monohydrate (MONODOX) 100 mg capsule       Genitourinary    Endometrial mass - Primary     -   Given postmenopausal bleeding and elevated  this is concerning for potential malignancy  I discussed with patient differential diagnosis including borderline and malignant pathology  Endometrial biopsy was obtained today  We will follow up on results  Regardless of result, I have recommended surgical intervention  Patient will 1st have to undergo cardiac clearance  If cleared, I have recommended and she has consented to proceed with possible robotic versus open hysterectomy, bilateral salpingo-oophorectomy, possible staging and all other indicated procedures  Patient underwent noninvasive cardiac testing and cardiology clearance is pending  I will order basic laboratory testing  She has recent EKG  I will order chest x-ray    CA 125 noted and elevated  Will obtain hemoglobin A1c as per surgical site infection reduction protocol  I discussed with patient indication, risks, benefits and alternatives of surgical exploration  We discussed possibility of bleeding requiring blood transfusion, life-threatening infections requiring additional procedures, injuries to surrounding organs such as bladder, ureters, gastrointestinal tract and or neurovascular structures  Additionally, we discussed general risks associated to stress of surgery such as venous thromboembolism, acute myocardial events and stroke  All questions answered to patient's satisfaction  She agrees and wants to proceed  Informed consent form signed  Relevant Orders    Case request operating room: HYSTERECTOMY LAPAROSCOPIC TOTAL ([address]) W/ ROBOTICS:   POSSIBLE ROBOTIC VERSUS OPEN HYSTERECTOMY, BILATERAL SALPINGO-OOPHORECTOMY, POSSIBLE STAGING AND ALL OTHER INDICATED PROCEDURES  (Completed)    Ambulatory referral to Cardiology    Comprehensive metabolic panel    CBC and differential    Type and screen    HEMOGLOBIN A1C W/ EAG ESTIMATION    XR chest pa & lateral    Nephrolithiasis     -  Incidentally diagnosed on CT scan  Patient pattern of pain does not suggest these have been symptomatic  Calculi are nonobstructive  At this time given acute concern for gynecologic malignancy, I have recommended to not undergo any additional workup or intervention for her kidney stones  She will be referred back to Urology after interventions for potential gynecologic malignancy are completed              Other    Elevated CA-125    Relevant Orders    Case request operating room: HYSTERECTOMY LAPAROSCOPIC TOTAL ([address]) W/ ROBOTICS:   POSSIBLE ROBOTIC VERSUS OPEN HYSTERECTOMY, BILATERAL SALPINGO-OOPHORECTOMY, POSSIBLE STAGING AND ALL OTHER INDICATED PROCEDURES  (Completed)    Smoking     -   Patient counseled extensively about multiple health benefits of smoking cessation specially given Cardiology and current concern for gynecologic malignancy  I urged the patient to quit smoking immediately  External resources offered as needed  CHIEF COMPLAINT:      Patient here for consultation, evaluation treatment of suspected gynecologic malignancy  Patient ID: Sujey Hensley is a 68 y o  female  HPI    41-year-old white morbidly obese female with history of chronic venous insufficiency, diabetes, recently diagnosed heart disease, gastroesophageal reflux disease, nephrolithiasis was in her usual state of health until the winter of 2017  At that time, she started experiencing heartburn  These improved somewhat with over-the-counter antacid  However, by April she reported to her primary care provider epigastric and pelvic constant pain  Describes her pain as soreness  Not radiated  Localized to the epigastrium and mid pelvic region  A CT scan was ordered which demonstrated incidental kidney stones, enlarged uterus with thickened endometrium, ascites  Patient was referred to Urology and Gynecology  Gynecologist ordered ultrasound which demonstrated thickened endometrium measuring 8 mm, concern for polyp and lateral mass suggestive of leiomyoma versus adnexal lesion  Her  was ordered and noted to be elevated  at 128 units/milliliter  She was referred to us for consultation and management  Reports postmenopausal bleeding which she describes as on and off spotting for the last 2 months  Of note, from the urologic standpoint she was taken to the operating room recently with plans to manage and the urological management of kidney stones  At the time, she was noted to have high blood pressure during mobilization from the operating room table and changes in her EKG  Procedure was aborted  She was referred to Cardiology  Noninvasive cardiac testing was done yesterday  Results are pending  She is not symptomatic from the urologic standpoint    Denies hematuria  Review of Systems    Her 12 point review of systems as above, otherwise unremarkable  Current Outpatient Prescriptions   Medication Sig Dispense Refill    amLODIPine (NORVASC) 5 mg tablet Take 5 mg by mouth every morning      ciprofloxacin (CIPRO) 750 mg tablet take 1 tablet by mouth twice a day for 10 days  0    esomeprazole (NexIUM) 40 MG capsule   0    furosemide (LASIX) 80 mg tablet Take 80 mg by mouth daily      glucosamine-chondroitin 500-400 MG tablet Take 1 tablet by mouth 2 (two) times a day        losartan (COZAAR) 100 MG tablet Take 100 mg by mouth every morning      metFORMIN (GLUCOPHAGE) 500 mg tablet Take 500 mg by mouth 2 (two) times a day with meals      potassium chloride (KLOR-CON) 20 mEq packet Take 20 mEq by mouth daily      pregabalin (LYRICA) 200 MG capsule Take 200 mg by mouth 2 (two) times a day      Probiotic Product (PROBIOTIC-10 ULTIMATE) CAPS Take by mouth      doxycycline monohydrate (MONODOX) 100 mg capsule Take 1 capsule (100 mg total) by mouth 2 (two) times a day for 10 days 20 capsule 0     No current facility-administered medications for this visit  Allergies   Allergen Reactions    Pentazocine Abdominal Pain     Reaction Date: [id number];     Sulfa Antibiotics Hives       Past Medical History:   Diagnosis Date    Diabetes mellitus ([address] )     type 2    Fever     low grade for over a month     Fibromyalgia, primary     Hypertension     Kidney disorder     Mass     ovary or uterus    Stomach ache     UTI (urinary tract infection)     Dx on 06/11/18 per the pt post urologist call    Venous insufficiency        Past Surgical History:   Procedure Laterality Date    CATARACT EXTRACTION     [address]    lap    COLONOSCOPY N/A 6/13/2018    Procedure: COLONOSCOPY;  Surgeon: Sai Lora MD;  Location: [place] [address] [facility];   Service: Gastroenterology    KNEE ARTHROSCOPY Left     x5    KNEE ARTHROSCOPY Right     x2    GA EGD TRANSORAL BIOPSY SINGLE/MULTIPLE N/A 2018    Procedure: ESOPHAGOGASTRODUODENOSCOPY (EGD); Surgeon: Alivia Bishop MD;  Location: [facility]; Service: Gastroenterology       OB History      Para Term  AB Living    4 3            SAB TAB Ectopic Multiple Live Births                       Family History   Problem Relation Age of Onset   [employer] Cancer Mother         breast age 48   [employer] Early death Mother     No Known Problems Daughter     No Known Problems Son     No Known Problems Son        The following portions of the patient's history were reviewed and updated as appropriate: allergies, current medications, past family history, past medical history, past social history, past surgical history and problem list       Objective:    Blood pressure 140/84, pulse (!) 106, temperature 98 7 °F (37 1 °C), temperature source Oral, resp  rate 20, height 5' 6 5" (1 689 m), weight 109 kg (241 lb 4 8 oz)  Body mass index is 38 36 kg/m²  Physical Exam   Constitutional: She is oriented to person, place, and time  She appears well-developed and well-nourished  HENT:   Head: Normocephalic and atraumatic  Neck: Normal range of motion  Neck supple  No thyromegaly present  Cardiovascular: Normal rate and regular rhythm  No murmur heard  Pulmonary/Chest: Effort normal and breath sounds normal  No respiratory distress  She has no wheezes  She has no rales  Abdominal: Soft  She exhibits no distension and no mass  There is no rebound  Genitourinary:   Genitourinary Comments:   Normal external genitalia  No vulvar lesions  Vulva and vaginal atrophy noted  Posterior vaginal agglutination and fibrosis without discrete intraluminal tumor  Cervix markedly displaced anteriorly  Endometrial biopsy obtained  Pipelle advanced easily to 10 cm and abundant amount of tissue obtained and sent for surgical pathology  Patient tolerated well  Bimanual exam is limited by body habitus  The uterus appears enlarged    I cannot appreciate definitive adnexal masses  Musculoskeletal: Normal range of motion  She exhibits no edema  Lymphadenopathy:     She has no cervical adenopathy  Neurological: She is alert and oriented to person, place, and time  Skin: Skin is warm and dry  No rash noted  Psychiatric: She has a normal mood and affect  Her behavior is normal    Vitals reviewed   as per HPI  Lab Results   Component Value Date    WBC 8 20 06/21/2018    HGB 13 2 06/21/2018    HCT 40 5 06/21/2018    MCV 98 06/21/2018     06/21/2018     Lab Results   Component Value Date     06/21/2018    K 3 4 (L) 06/21/2018     06/21/2018    CO2 30 06/21/2018    ANIONGAP 10 06/21/2018    BUN 8 06/21/2018    CREATININE 0 84 06/21/2018    GLUCOSE 143 (H) 06/21/2018    GLUF 143 (H) 06/21/2018    CALCIUM 9 2 06/21/2018    EGFR 70 06/21/2018 6/4/2018 Study Result     CT ABDOMEN AND PELVIS WITH IV CONTRAST     INDICATION:   R10 9: Unspecified abdominal pain  R10 819: Abdominal tenderness, unspecified site  R50 9: Fever, unspecified  Fever, abdominal pain, bloating        COMPARISON: None     TECHNIQUE:  CT examination of the abdomen and pelvis was performed  Axial, sagittal, and coronal 2D reformatted images were created from the source data and submitted for interpretation      Radiation dose length product (DLP) for this visit:  1342 26 mGy-cm     This examination, like all CT scans performed in the [facility], was performed utilizing techniques to minimize radiation dose exposure, including the use of   iterative reconstruction and automated exposure control      IV Contrast:  100 mL of iohexol (OMNIPAQUE)  350  Enteric Contrast:  Enteric contrast was administered      FINDINGS:     ABDOMEN     LOWER CHEST:  Moderate coronary atherosclerosis, otherwise no clinically significant abnormality identified in the visualized lower chest      LIVER/BILIARY TREE:  Unremarkable      GALLBLADDER: Status post cholecystectomy  Mild prominence of the biliary tree is noted, probably secondary to postcholecystectomy state      SPLEEN:  Unremarkable      PANCREAS:  Unremarkable      ADRENAL GLANDS:  2 4 x 1 7 cm right adrenal nodule, nonspecific  Some mild thickening of the left adrenal gland is noted  Otherwise unremarkable      KIDNEYS/URETERS:  Some mild renal cortical scarring is suspected on the right  There is a large calculus within the inferior pole calyx measuring approximately 1 5 x 1 0 cm in size  The right kidney otherwise appears grossly unremarkable with the   exception of several tiny scattered nonobstructing calculi; no hydroureteronephrosis      Approximately 10 nonobstructing calculi are seen in the left kidney, the largest of which measures approximately 7 mm in size  There is mild left hydroureteronephrosis but no discrete obstructing stone is identified  The left kidney otherwise appears   unremarkable      STOMACH AND BOWEL:  The appendix is not clearly identified  No evidence of bowel obstruction  Otherwise unremarkable      APPENDIX:  No discrete evidence of acute appendicitis      ABDOMINOPELVIC CAVITY:  Small volume ascites is seen  Some stranding and nodular thickening of the anterior mesenteric fat is seen in the upper and mid abdomen (for example, axial images 37 through 52)  No intraperitoneal free air      VESSELS:  The aorta is tortuous  There is mild to moderate scattered atherosclerotic disease  No aortic aneurysm      PELVIS     REPRODUCTIVE ORGANS:  Heterogeneous appearance of the myometrium and thickening of the endometrial stripe, measuring approximately 8 mm in thickness  There is a complex appearing lesion along the posterior left uterine fundus which measures   approximately 5 5 x 4 7 x 3 6 cm in size, this may be within the left ovary    Several small focal calcified fibroids are seen in the right uterine fundus      URINARY BLADDER: Unremarkable      ABDOMINAL WALL/INGUINAL REGIONS:  Mild body wall edema  The anterior abdominal wall is intact      OSSEOUS STRUCTURES:  Degenerative changes are seen at L4/L5 and L5/S1 with vacuum disc phenomenon  There are mild degenerative changes of the left hip  No acute fracture is seen  No suspicious appearing osseous lesions are identified      IMPRESSION:     Heterogeneous appearance of the myometrium with thickening of the endometrial stripe  There is a complex appearing lesion along the posterior left uterine fundus measuring approximately 5 5 x 4 7 x 3 6 cm in size      In addition, small volume ascites is seen  Some stranding and nodular thickening of the anterior mesenteric fat is seen in the upper and mid abdomen (for example, axial images 37 through 52)  Given the findings taken together, the diagnosis of   exclusion is neoplasm which could be uterine or ovarian with associated peritoneal metastatic disease  Correlation with the patient's symptoms and laboratory values recommended  Pelvic ultrasound and/or pelvic MR with and without contrast may be of   benefit for further evaluation      Bilateral nephrolithiasis  Mild hydroureteronephrosis on the left which could be related to ureteral extrinsic compression or ureteral involvement in the left pelvis (axial image 66)     Right adrenal nodule, left adrenal thickening, body wall edema, and other findings as above      Findings to be called to the referring caregiver's office by the radiology reading room liaison      Workstation performed: [id number]       6/25/2018 Study Result     PELVIC ULTRASOUND, COMPLETE     INDICATION:  68years old   R22 9: Localized swelling, mass and lump, unspecified      COMPARISON: CT on 06/04/2018     TECHNIQUE:   Transabdominal pelvic ultrasound was performed in sagittal and transverse planes with a curvilinear transducer    Additional transvaginal imaging was performed to better evaluate the endometrium and ovaries  Imaging included volumetric   sweeps as well as traditional still imaging technique      FINDINGS:     UTERUS:  The uterus is normal in position, measuring 10 8 x 5 7 8 9 cm  Contour and echotexture appears heterogeneous  Adjacent to the left side of the uterus is a mass measuring 5 9 x 3 3 x 2 8 cm  This could be uterine in origin although I cannot exclude this originating from the left ovary  The cervix shows no   suspicious abnormality      ENDOMETRIUM:    There is fluid in the endometrial canal   There is an apparent 8 mm endometrial polyp present  The endometrium is thickened measuring 8 mm      OVARIES/ADNEXA:  Right ovary: Not visualized        Left ovary: Mass arising from the uterus versus the left ovary described above        There is pelvic ascites present      IMPRESSION:        1   Enlarged heterogeneous uterus  2   Thickened endometrium with at least one endometrial polyp  3   Mass arising from the posterior uterus on the left versus a left adnexal mass  4   Pelvic ascites  Findings are concerning for pelvic malignancy either arising from the uterus or the left ovary  Surgical evaluation recommended    If clinically appropriate, pelvic MRI may provide additional information                     Workstation performed: [id number]         iNcole Fernandez MD, Virginia Neely Oh  6/28/2018  10:17 AM

## 2018-07-26 NOTE — ANESTHESIA POSTPROCEDURE EVALUATION
Post-Op Assessment Note      CV Status:  Stable    Mental Status:  Alert and awake    Hydration Status:  Euvolemic    PONV Controlled:  Controlled    Airway Patency:  Patent    Post Op Vitals Reviewed: Yes          Staff: Anesthesiologist           /66 (07/26/18 0930)    Temp 98 3 °F (36 8 °C) (07/26/18 0930)    Pulse 78 (07/26/18 0930)   Resp 18 (07/26/18 0930)    SpO2 99 % (07/26/18 0930)

## 2018-07-26 NOTE — PROGRESS NOTES
Dr Fuentes Doing in PACU at bedside with patient, aware of mild swelling around trochar site, no new orders received

## 2018-07-26 NOTE — ANESTHESIA PREPROCEDURE EVALUATION
Review of Systems/Medical History  Patient summary reviewed  Chart reviewed  No history of anesthetic complications     Cardiovascular  EKG reviewed, Exercise comment: Deconditioned  Seldom climbs flight of stairs at home due to dyspnea Hypertension ,   Comment: Recent urologic procedure aborted after patient developed ECG changes on transfer from stretcher to OR table  Subsequent stress test normal  Chronic venous inusfficiency,  Pulmonary  Smoker (Quit 3 weeks ago) ,        GI/Hepatic    GERD well controlled,   Comment: Confirmed NPO appropriate     Kidney stones,        Endo/Other  Diabetes (7/9/18 HgbA1c 5 8%) type 2 Oral agent,   Obesity    GYN      Comment: Endometrial mass     Hematology   Musculoskeletal       Neurology      Comment: Peripheral neuropathy Psychology           Physical Exam    Airway    Mallampati score: II  TM Distance: >3 FB  Neck ROM: full     Dental       Cardiovascular  Rhythm: regular, No murmur,     Pulmonary  Breath sounds clear to auscultation,     Other Findings    7/16/18 TTE:  LEFT VENTRICLE:  Systolic function was normal  Ejection fraction was estimated in the range of 55 % to 60 % to be 55 %  Although no diagnostic regional wall motion abnormality was identified, this possibility cannot be completely excluded on the basis of this study  Wall thickness was mildly increased  There was mild concentric hypertrophy  Doppler parameters were consistent with abnormal left ventricular relaxation (grade 1 diastolic dysfunction)      LEFT ATRIUM:  The atrium was mildly dilated      MITRAL VALVE:  There was mild annular calcification  There was mild regurgitation      TRICUSPID VALVE:  There was mild regurgitation  Estimated peak PA pressure was 25 mmHg      AORTA:  The root exhibited mild dilatation  Size 4 4 to 4 5 cm  Consider alternate imaging modality for accurate sizing       6/27/18 SPECT:  SUMMARY:  -  Stress results: There was no chest pain during stress    -  ECG conclusions: The stress ECG was negative for ischemia and normal   -  Perfusion imaging: There were no perfusion defects   -  Gated SPECT: The calculated left ventricular ejection fraction was 70 %  Left ventricular ejection fraction was within normal limits by visual estimate  There was no left ventricular regional abnormality      IMPRESSIONS: Normal study after pharmacologic vasodilation  Myocardial perfusion imaging was normal at rest and with stress  Left ventricular systolic function was normal     Anesthesia Plan  ASA Score- 3     Anesthesia Type- general with ASA Monitors  Additional Monitors: arterial line  Airway Plan: ETT  Plan Factors-    Induction- intravenous  Postoperative Plan- Plan for postoperative opioid use  Planned trial extubation    Informed Consent- Anesthetic plan and risks discussed with patient

## 2018-08-08 NOTE — LETTER
August 9, 2018     Alicia Garcia, [address] [facility] [address]    Patient: Mansi Clifton   YOB: 1945   Date of Visit: 8/8/2018       Dear Dr Geri Gonzalez:    Thank you for referring Mansi Clifton to me for evaluation  Below are my notes for this consultation  If you have questions, please do not hesitate to call me  I look forward to following your patient along with you  Sincerely,        Jeimy Veronica MD        CC: MD Jeimy Cummins MD  8/9/2018  1:23 PM  Sign at close encounter  Assessment/Plan:    Problem List Items Addressed This Visit        Other    S/P laparoscopic procedure    Peritoneal carcinomatosis [facility]) - Primary     - Final pathology demonstrates intestinal adenocarcinoma  I explained to her carcinomatosis and malignant ascites are not from gynecologic origin  She understands the grave nature of her disease  I clearly explained how I am not an expert in the management of this condition and therefore she requires evaluation and management recommendations per medical oncology  Patient accepts this recommendation  We will schedule her to see Dr Hailey Colón with Medical Oncology in [facility]  Malignant ascites     - as expected ascites has reaccumulated since her procedure  I recommended for her to contact her primary care physician for potential scheduled of paracentesis if she becomes symptomatic prior to her appointment with Medical Oncology  CHIEF COMPLAINT:     Postoperative follow-up  Patient ID: Mansi Clifton is a 68 y o  female  HPI  Patient presented with carcinomatosis and malignant ascites  She is now status post laparoscopic assessment which demonstrated ascites, diffuse carcinomatosis  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  She presents today for follow-up    The following portions of the patient's history were reviewed and updated as appropriate: allergies, current medications, past family history, past medical history, past social history, past surgical history and problem list     Review of Systems  Increased abdominal girth/reaccumulating ascites  Otherwise 12 point review of systems is unchanged    Current Outpatient Prescriptions   Medication Sig Dispense Refill    amLODIPine (NORVASC) 5 mg tablet Take 5 mg by mouth every morning      cetirizine (ZyrTEC) 10 mg tablet Take 10 mg by mouth daily at bedtime      esomeprazole (NexIUM) 40 MG capsule daily    0    furosemide (LASIX) 80 mg tablet Take 80 mg by mouth daily      glucosamine-chondroitin 500-400 MG tablet Take 1 tablet by mouth 2 (two) times a day        ibuprofen (MOTRIN) 600 mg tablet Take 1 tablet (600 mg total) by mouth every 6 (six) hours as needed for mild pain (cramping) 30 tablet 0    losartan (COZAAR) 100 MG tablet Take 100 mg by mouth every morning      metFORMIN (GLUCOPHAGE) 500 mg tablet Take 500 mg by mouth 2 (two) times a day with meals      potassium chloride (KLOR-CON) 20 mEq packet Take 20 mEq by mouth daily      pregabalin (LYRICA) 200 MG capsule Take 200 mg by mouth 2 (two) times a day      Probiotic Product (PROBIOTIC-10 ULTIMATE) CAPS Take by mouth daily        varenicline (CHANTIX ROLO) 0 5 MG X 11 & 1 MG X 42 tablet Take one 0 5mg tab by mouth 1x daily for 3 days, then increase to one 0 5mg tab 2x daily for 3 days, then increase to one 1mg tab 2x daily (Patient taking differently: 2 (two) times a day Take one 0 5mg tab by mouth 1x daily for 3 days, then increase to one 0 5mg tab 2x daily for 3 days, then increase to one 1mg tab 2x daily ) 53 tablet 0    acetaminophen (TYLENOL) 325 mg tablet Take 2 tablets (650 mg total) by mouth every 6 (six) hours as needed for mild pain, headaches or fever 30 tablet 0    doxycycline monohydrate (MONODOX) 100 mg capsule Take 100 mg by mouth 2 (two) times a day       No current facility-administered medications for this visit  Objective:    Blood pressure 132/78, pulse 94, temperature 97 6 °F (36 4 °C), temperature source Oral, resp  rate 18, height 5' 6 5" (1 689 m), weight 117 kg (258 lb 14 4 oz)  Body mass index is 41 16 kg/m²  Body surface area is 2 24 meters squared  Physical Exam  Abdomen:  Ascites noted  Nontender to palpation, no rebound, no guarding  Incisions well healed  Case Report     Case Report   Surgical Pathology Report                         Case: [id number]                                    Authorizing Provider: Aron Aguilar MD      Collected:           07/26/2018 0905               Ordering Location:     [facility]      Received:            07/26/2018 [address] [place] [address] Operating Room                                                       Pathologist:           Jaqueline Osman MD                                                                 Specimen:    Peritoneum, biopsy                                                                         Final Diagnosis   A  Peritoneum (biopsy):     - Adenocarcinoma with intestinal differentiation      Comment:  Clinical / radiologic correlation is required  The differential includes gastrointestinal vs  gynecologic with intestinal differentiation  Electronically signed by Jaqueline Osman MD on 8/1/2018 at 10:28 AM   Preliminary result electronically signed by Jaqueline Osman MD on 7/30/2018 at 11:47 AM   Microscopic Description   - Immunohistochemical studies (with appropriate controls) demonstrate:     - Positive: CK20, CDX2, CK7 (focal)     - Negative: TTF1, YASMEEN-3, [place], WT1, [place]-8, calretinin, CA-125, P16, P53 (wild-type), ER, CD56, synaptophysin     - Proliferation index (Ki-67): Patchy elevation, reaching 60-70% in areas      Comments: This is an appended report  These results have been appended to a previously preliminary verified report        Note   - Portion of operative note "Diffuse peritoneal carcinomatosis with involvement of all peritoneal surfaces, anti-mesenteric surface of small bowel, omental caking and tumor coating of pelvic organs consistent with 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  Peritoneal biopsy taken from grossly abnormal area within the anterior abdominal wall "  - Interpretation performed at [facility], [address] [place] [address]  - Intradepartmental consultation concurs with the diagnosis  Peterd Bodarlene from Dr Bobo Moss office notified via phone at 10:26 am on 08/01/2018  Additional Information   All controls performed with the immunohistochemical stains reported above reacted appropriately  These tests were developed and their performance characteristics determined by [address] [place] [address] or [facility]  They may not be cleared or approved by the [place]  Food and Drug Administration  The FDA has determined that such clearance or approval is not necessary  These tests are used for clinical purposes  They should not be regarded as investigational or for research  This laboratory has been approved by CLIA 88, designated as a high-complexity laboratory and is qualified to perform these tests          Gross Description   A  The specimen is received in formalin, labeled with the patient's name and hospital number, and is designated "peritoneal biopsy  The specimen consists of a single purple-yellow, rubbery, irregularly-shaped fibrofatty portion of tissue with focal areas of exudate measuring 5 0 x 1 5 x 0 7 cm in greatest dimension  The specimen is serially sectioned revealing tan-yellow, focally rubbery, focally fatty cut surfaces  Representative sections are submitted in 5 cassettes      Note: The estimated total formalin fixation time based upon information provided by the submitting clinician and the standard processing schedule is under 72 hours       Kendall      Clinical Information   Elevated CA-125     Endometrial miranda Zuleta MD, [place], FACS  8/9/2018  1:23 PM

## 2018-08-09 NOTE — PROGRESS NOTES
Assessment/Plan:    Problem List Items Addressed This Visit        Other    S/P laparoscopic procedure    Peritoneal carcinomatosis ([facility] [address] ) - Primary     - Final pathology demonstrates intestinal adenocarcinoma  I explained to her carcinomatosis and malignant ascites are not from gynecologic origin  She understands the grave nature of her disease  I clearly explained how I am not an expert in the management of this condition and therefore she requires evaluation and management recommendations per medical oncology  Patient accepts this recommendation  We will schedule her to see Dr Max Garcia with Medical Oncology in [facility]  Malignant ascites     - as expected ascites has reaccumulated since her procedure  I recommended for her to contact her primary care physician for potential scheduled of paracentesis if she becomes symptomatic prior to her appointment with Medical Oncology  CHIEF COMPLAINT:     Postoperative follow-up  Patient ID: Ansley Pan is a 68 y o  female  HPI  Patient presented with carcinomatosis and malignant ascites  She is now status post laparoscopic assessment which demonstrated ascites, diffuse carcinomatosis  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  She presents today for follow-up  The following portions of the patient's history were reviewed and updated as appropriate: allergies, current medications, past family history, past medical history, past social history, past surgical history and problem list     Review of Systems  Increased abdominal girth/reaccumulating ascites  Otherwise 12 point review of systems is unchanged    Current Outpatient Prescriptions   Medication Sig Dispense Refill    amLODIPine (NORVASC) 5 mg tablet Take 5 mg by mouth every morning      cetirizine (ZyrTEC) 10 mg tablet Take 10 mg by mouth daily at bedtime      esomeprazole (NexIUM) 40 MG capsule daily    0    furosemide (LASIX) 80 mg tablet Take 80 mg by mouth daily      glucosamine-chondroitin 500-400 MG tablet Take 1 tablet by mouth 2 (two) times a day        ibuprofen (MOTRIN) 600 mg tablet Take 1 tablet (600 mg total) by mouth every 6 (six) hours as needed for mild pain (cramping) 30 tablet 0    losartan (COZAAR) 100 MG tablet Take 100 mg by mouth every morning      metFORMIN (GLUCOPHAGE) 500 mg tablet Take 500 mg by mouth 2 (two) times a day with meals      potassium chloride (KLOR-CON) 20 mEq packet Take 20 mEq by mouth daily      pregabalin (LYRICA) 200 MG capsule Take 200 mg by mouth 2 (two) times a day      Probiotic Product (PROBIOTIC-10 ULTIMATE) CAPS Take by mouth daily        varenicline (CHANTIX ROLO) 0 5 MG X 11 & 1 MG X 42 tablet Take one 0 5mg tab by mouth 1x daily for 3 days, then increase to one 0 5mg tab 2x daily for 3 days, then increase to one 1mg tab 2x daily (Patient taking differently: 2 (two) times a day Take one 0 5mg tab by mouth 1x daily for 3 days, then increase to one 0 5mg tab 2x daily for 3 days, then increase to one 1mg tab 2x daily ) 53 tablet 0    acetaminophen (TYLENOL) 325 mg tablet Take 2 tablets (650 mg total) by mouth every 6 (six) hours as needed for mild pain, headaches or fever 30 tablet 0    doxycycline monohydrate (MONODOX) 100 mg capsule Take 100 mg by mouth 2 (two) times a day       No current facility-administered medications for this visit  Objective:    Blood pressure 132/78, pulse 94, temperature 97 6 °F (36 4 °C), temperature source Oral, resp  rate 18, height 5' 6 5" (1 689 m), weight 117 kg (258 lb 14 4 oz)  Body mass index is 41 16 kg/m²  Body surface area is 2 24 meters squared  Physical Exam  Abdomen:  Ascites noted  Nontender to palpation, no rebound, no guarding  Incisions well healed        Case Report     Case Report   Surgical Pathology Report                         Case: [id number]                                    Authorizing Provider: Evelyne Matthews MD Petar      Collected:           07/26/2018 0905               Ordering Location:     [facility] [address]      Received:            07/26/2018 1029                                      Hospital Operating Room                                                       Pathologist:           Arlyn Ferris MD                                                                 Specimen:    Peritoneum, biopsy                                                                         Final Diagnosis   A  Peritoneum (biopsy):     - Adenocarcinoma with intestinal differentiation      Comment:  Clinical / radiologic correlation is required  The differential includes gastrointestinal vs  gynecologic with intestinal differentiation  Electronically signed by Arlyn Ferris MD on 8/1/2018 at 10:28 AM   Preliminary result electronically signed by Arlyn Ferris MD on 7/30/2018 at 11:47 AM   Microscopic Description   - Immunohistochemical studies (with appropriate controls) demonstrate:     - Positive: CK20, CDX2, CK7 (focal)     - Negative: TTF1, YASMEEN-3, [place], WT1, [place]-8, calretinin, CA-125, P16, P53 (wild-type), ER, CD56, synaptophysin     - Proliferation index (Ki-67): Patchy elevation, reaching 60-70% in areas      Comments: This is an appended report  These results have been appended to a previously preliminary verified report  Note   - Portion of operative note "Diffuse peritoneal carcinomatosis with involvement of all peritoneal surfaces, anti-mesenteric surface of small bowel, omental caking and tumor coating of pelvic organs consistent with 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  Peritoneal biopsy taken from grossly abnormal area within the anterior abdominal wall "  - Interpretation performed at [place] [address]  - Intradepartmental consultation concurs with the diagnosis  Domingo Mensah from Dr Pasquale Cleary office notified via phone at 10:26 am on 08/01/2018        Additional Information   All controls performed with the immunohistochemical stains reported above reacted appropriately  These tests were developed and their performance characteristics determined by [facility] or [facility]  They may not be cleared or approved by the [place]  Food and Drug Administration  The FDA has determined that such clearance or approval is not necessary  These tests are used for clinical purposes  They should not be regarded as investigational or for research  This laboratory has been approved by [facility] 88, designated as a high-complexity laboratory and is qualified to perform these tests          Gross Description   A  The specimen is received in formalin, labeled with the patient's name and hospital number, and is designated "peritoneal biopsy  The specimen consists of a single purple-yellow, rubbery, irregularly-shaped fibrofatty portion of tissue with focal areas of exudate measuring 5 0 x 1 5 x 0 7 cm in greatest dimension  The specimen is serially sectioned revealing tan-yellow, focally rubbery, focally fatty cut surfaces  Representative sections are submitted in 5 cassettes      Note: The estimated total formalin fixation time based upon information provided by the submitting clinician and the standard processing schedule is under 72 hours       Kendall      Clinical Information   Elevated CA-125  Endometrial mass         Alice Read MD, [address]  8/9/2018  1:23 PM

## 2018-08-09 NOTE — ASSESSMENT & PLAN NOTE
- Final pathology demonstrates intestinal adenocarcinoma  I explained to her carcinomatosis and malignant ascites are not from gynecologic origin  She understands the grave nature of her disease  I clearly explained how I am not an expert in the management of this condition and therefore she requires evaluation and management recommendations per medical oncology  Patient accepts this recommendation  We will schedule her to see Dr Aries Thompson with Medical Oncology in [facility]

## 2018-08-09 NOTE — ASSESSMENT & PLAN NOTE
- as expected ascites has reaccumulated since her procedure  I recommended for her to contact her primary care physician for potential scheduled of paracentesis if she becomes symptomatic prior to her appointment with Medical Oncology

## 2018-08-17 NOTE — SOCIAL WORK
MSW received a Distress Thermometer from [facility], where the pt self scored a 5 to indicate her level of stress  The pt marked off insurance/financial, worry and loss of interest in activities  The pt marked off 11 out of 21 physical problems  As per the pt's chart, it appears as though nursing is addressing the physical problems  MSW left a voicemail for the pt and explained the role of the cancer counselor and a phone number was left  MSW encouraged the pt to return the call

## 2018-08-27 NOTE — PROGRESS NOTES
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  Issues:    1  Malignancy  Etiology is still not clear  I do not have a CEA level or  level - these have been ordered  Obviously the origin of the tumor needs to be clarified  I will speak with Dr Michael Sands, GI  Patient states that he told her "there was a shadow on the endoscopy " - this needs clarification  Scans have also been ordered  Mrs Roger Yu understands that she has a malignancy but that it is crucial to know where the tumor began as this will help dictate treatment options  Patient states that Tylenol is working for the pain at this time; Mrs Roger Yu will call if she has additional pain control issues  2  Tense ascites/carcinomatosis  Patient has been referred to IR for paracentesis  I will have the fluid sent for evaluation  Patient is to return in 2 weeks but this will change if the above information is available sooner  Patient knows to call the hematology/oncology office if there are any other questions or concerns  Carefully review your medication list and verify that the list is accurate and up-to-date  Please call the hematology/oncology office if there are medications missing from the list, medications on the list that you are not currently taking or if there is a dosage or instruction that is different from how you're taking that medication      Patient goals and areas of care:  Cancer origin workup  Patient is able to self-care   ______________________________________________________________________________________    Chief Complaint   Patient presents with    Consult     Ascites, pathologic diagnosis of adenocarcinoma     History of Present Illness:    73-year-old female with a complicated recent cancer history  Mrs Juanita Maldonado began to have abdominal pain and ascites approximately 3 months ago  Patient has been seen by GI and GYN  Recent peritoneal biopsy demonstrated adenocarcinoma of GI origin  Presently Mrs Juanita Maldonado states feeling poorly  Patient has undergone paracentesis but the fluid has reaccumulated  Patient has occasional spasmodic pains in her abdomen and pelvis  No nausea vomiting, no diarrhea or GI bleeding  Appetite is +/-  Patient has low-grade fevers but no chills or sweats  Activities are limited  No shortness of breath at rest, mild dyspnea on exertion  No headaches or dizziness, syncopal episodes  Patient states that she has vaginal spotting that she is fairly certain is GYN every few days  Review of Systems   Constitutional: Positive for fatigue, fever and unexpected weight change  HENT: Negative  Eyes: Negative  Respiratory: Negative  Cardiovascular: Positive for leg swelling  Gastrointestinal: Positive for abdominal distention and abdominal pain  Endocrine: Negative  Genitourinary: Positive for vaginal bleeding  Musculoskeletal: Negative  Skin: Negative  Allergic/Immunologic: Negative  Neurological: Negative  Hematological: Negative  Psychiatric/Behavioral: Negative  All other systems reviewed and are negative       Patient Active Problem List   Diagnosis    Elevated CA-125    Endometrial mass    Cardiopathy    Nephrolithiasis    Chronic venous insufficiency    Smoking    Generalized edema    History of recurrent UTIs    S/P laparoscopic procedure    Peritoneal carcinomatosis (HCC)    Malignant ascites     Past Medical History:   Diagnosis Date    Cellulitis     Diabetes mellitus ([address] )     type 2    Fever     low grade for over a month     Fibromyalgia, primary     History of colonoscopy     History of esophagogastroduodenoscopy (EGD)     Hypertension     Kidney disorder     Mass     ovary or uterus    Stomach ache     UTI (urinary tract infection)     Dx on 06/11/18 per the pt post urologist call    Venous insufficiency     Venous insufficiency (chronic) (peripheral)      Past Surgical History:   Procedure Laterality Date    CATARACT EXTRACTION      CHOLECYSTECTOMY  1995    lap    COLONOSCOPY N/A 6/13/2018    Procedure: COLONOSCOPY;  Surgeon: Michelle Waterman MD;  Location: [facility] GI LAB; Service: Gastroenterology    KNEE ARTHROSCOPY Left     x5    KNEE ARTHROSCOPY Right     x2    DE EGD TRANSORAL BIOPSY SINGLE/MULTIPLE N/A 6/13/2018    Procedure: ESOPHAGOGASTRODUODENOSCOPY (EGD); Surgeon: Michelle Waterman MD;  Location: [facility] GI LAB; Service: Gastroenterology    DE LAP,DIAGNOSTIC ABDOMEN N/A 7/26/2018    Procedure: diagnostic laparoscopy, peritoneal biopsy ;  Surgeon: Jessi Israel MD;  Location:  MAIN OR;  Service: Gynecology Oncology     Family History   Problem Relation Age of Onset    Cancer Mother         breast age 48    Early death Mother     No Known Problems Daughter     No Known Problems Son     No Known Problems Son      Social History     Social History    Marital status: /Civil Union     Spouse name: N/A    Number of children: N/A    Years of education: N/A     Occupational History    Not on file       Social History Main Topics    Smoking status: Former Smoker     Packs/day: 2 00     Years: 50 00     Types: Cigarettes     Quit date: 06/2018    Smokeless tobacco: Never Used      Comment: working on quitting    Alcohol use 1 2 oz/week     2 Glasses of wine per week      Comment: daily    Drug use: No    Sexual activity: Not on file     Other Topics Concern    Not on file     Social History Narrative    No narrative on file       Current Outpatient Prescriptions:     amLODIPine (NORVASC) 5 mg tablet, Take 5 mg by mouth every morning, Disp: , Rfl:    cetirizine (ZyrTEC) 10 mg tablet, Take 10 mg by mouth daily at bedtime, Disp: , Rfl:     esomeprazole (NexIUM) 40 MG capsule, daily  , Disp: , Rfl: 0    furosemide (LASIX) 80 mg tablet, Take 80 mg by mouth daily, Disp: , Rfl:     glucosamine-chondroitin 500-400 MG tablet, Take 1 tablet by mouth 2 (two) times a day  , Disp: , Rfl:     ibuprofen (MOTRIN) 600 mg tablet, Take 1 tablet (600 mg total) by mouth every 6 (six) hours as needed for mild pain (cramping), Disp: 30 tablet, Rfl: 0    losartan (COZAAR) 100 MG tablet, Take 100 mg by mouth every morning, Disp: , Rfl:     metFORMIN (GLUCOPHAGE) 500 mg tablet, Take 500 mg by mouth 2 (two) times a day with meals, Disp: , Rfl:     potassium chloride (K-DUR,KLOR-CON) 20 mEq tablet, TK 1 T PO  D, Disp: , Rfl: 3    potassium chloride (KLOR-CON) 20 mEq packet, Take 20 mEq by mouth daily, Disp: , Rfl:     pregabalin (LYRICA) 200 MG capsule, Take 200 mg by mouth 2 (two) times a day, Disp: , Rfl:     Probiotic Product (PROBIOTIC-10 ULTIMATE) CAPS, Take by mouth daily  , Disp: , Rfl:     varenicline (CHANTIX ROLO) 0 5 MG X 11 & 1 MG X 42 tablet, Take one 0 5mg tab by mouth 1x daily for 3 days, then increase to one 0 5mg tab 2x daily for 3 days, then increase to one 1mg tab 2x daily (Patient taking differently: 2 (two) times a day Take one 0 5mg tab by mouth 1x daily for 3 days, then increase to one 0 5mg tab 2x daily for 3 days, then increase to one 1mg tab 2x daily ), Disp: 53 tablet, Rfl: 0    Allergies   Allergen Reactions    Sulfa Antibiotics Hives       Vitals:    08/27/18 0815   BP: 132/80   Pulse: 80   Resp: 14   Temp: 99 3 °F (37 4 °C)   SpO2: 98%     Physical Exam   Constitutional: She is oriented to person, place, and time  She appears well-developed and well-nourished  Obese female, no respiratory distress, anxious   HENT:   Head: Normocephalic and atraumatic     Right Ear: External ear normal    Left Ear: External ear normal    Nose: Nose normal  Mouth/Throat: Oropharynx is clear and moist    Eyes: Conjunctivae and EOM are normal  Pupils are equal, round, and reactive to light  Neck: Normal range of motion  Neck supple  Cardiovascular: Normal rate, regular rhythm, normal heart sounds and intact distal pulses  Pulmonary/Chest: Effort normal and breath sounds normal    +rales at bases bilaterally   Abdominal: Soft  Bowel sounds are normal    Distended significantly, cannot palpate any organ, +bowel sounds, nontender, left upper quadrant scabbed (presumed from laparoscopic procedure)   Musculoskeletal: Normal range of motion  Neurological: She is alert and oriented to person, place, and time  She has normal reflexes  Skin: Skin is warm  Relatively good color, warm, moist, no petechiae or ecchymoses   Psychiatric: She has a normal mood and affect  Her behavior is normal  Judgment and thought content normal    Extremities:  3+ bilateral lower extremity pitting edema, no obvious cords, pulses = 0 bilaterally, no obvious signs of cellulitis but both legs are red  Lymphatics:  Or axilla bilaterally No adenopathy in the neck, supraclavicular region    Labs:  07/09/2018 WBC = 6 8 hemoglobin = 12 6 hematocrit = 30 1 platelet = 539 neutrophil = 63% lymphocytes = 23% monocyte = 10% BUN = 17 creatinine = 0 92 calcium = 9 3 total bilirubin = 0 3 alkaline phosphatase = 60 AST = 13 ALT = 10     07/06/2018 chest x-ray:  Impression stated no acute cardiopulmonary disease  Imaging    06/25/2018 pelvic ultrasound    1  Enlarged heterogeneous uterus  2   Thickened endometrium with at least one endometrial polyp  3   Mass arising from the posterior uterus on the left versus a left adnexal mass  4   Pelvic ascites  Findings are concerning for pelvic malignancy either arising from the uterus or the left ovary  Surgical evaluation recommended  If clinically appropriate, pelvic MRI may provide additional information      06/04/2018 CT scan abdomen/pelvis    Heterogeneous appearance of the myometrium with thickening of the endometrial stripe  There is a complex appearing lesion along the posterior left uterine fundus measuring approximately 5 5 x 4 7 x 3 6 cm in size      In addition, small volume ascites is seen  Some stranding and nodular thickening of the anterior mesenteric fat is seen in the upper and mid abdomen (for example, axial images 37 through 52)  Given the findings taken together, the diagnosis of exclusion is neoplasm which could be uterine or ovarian with associated peritoneal metastatic disease  Correlation with the patient's symptoms and laboratory values recommended  Pelvic ultrasound and/or pelvic MR with and without contrast may be of   benefit for further evaluation      Bilateral nephrolithiasis  Mild hydroureteronephrosis on the left which could be related to ureteral extrinsic compression or ureteral involvement in the left pelvis (axial image 66)     Right adrenal nodule, left adrenal thickening, body wall edema, and other findings as above    Pathology    Case Report   Surgical Pathology Report                         Case: [id number]                                    Authorizing Provider: Jessi Israel MD      Collected:           07/26/2018 0905               Ordering Location:     [facility]      Received:            07/26/2018 [facility]                                      Hospital Operating Room                                                       Pathologist:           Yohannes Ye MD                                                                 Specimen:    Peritoneum, biopsy                                                                         Final Diagnosis   A  Peritoneum (biopsy):     - Adenocarcinoma with intestinal differentiation      Comment:  Clinical / radiologic correlation is required  The differential includes gastrointestinal vs  gynecologic with intestinal differentiation  Electronically signed by Smith Luna MD on 8/1/2018 at 10:28 AM   Preliminary result electronically signed by Smith Luna MD on 7/30/2018 at 11:47 AM   Microscopic Description   - Immunohistochemical studies (with appropriate controls) demonstrate:     - Positive: CK20, CDX2, CK7 (focal)     - Negative: TTF1, YASMEEN-3, NE, WT1, Munson-8, calretinin, CA-125, P16, P53 (wild-type), ER, CD56, synaptophysin     - Proliferation index (Ki-67): Patchy elevation, reaching 60-70% in areas      Comments:          Case Report   Surgical Pathology Report                         Case: [id number]                                    Authorizing Provider: Paige Lopez MD      Collected:           06/28/2018 1252               Ordering Location:     [facility] Received:            06/28/2018 1252                                      [facility] [place]                                                            Pathologist:           Dimple Malcolm MD                                                                Specimen:    Endometrium                                                                                Final Diagnosis   A  Endometrium (curettage): - Extremely scant benign atrophic endometrium with focal atypia and associated blood     Comment:   - Suggest repeat biopsy due to the scant nature of the specimen and focal atypia  The focal atypia may represent surface metaplasia, however, a malignant process cannot be excluded     - Features of polyp are not identified in this material      Electronically signed by Dimple Malcolm MD on 7/2/2018 at  1:32 PM     Case Report   Surgical Pathology Report                         Case: [id number]                                    Authorizing Provider: Kyle Augustine MD           Collected:           06/13/2018 0929               Ordering Location:     [facility]   Received:            06/13/2018 1701                                      [place]                                                                        Pathologist:           Eliezer Castro MD                                                                Specimens:   A) - Stomach, 1   cold bx  antum, ulcerative area                                                    B) - Esophagus, 2  cold bx  lower esophagus                                                          C) - Polyp, Colorectal, 3  hot snare hepatic flexure polyp                                           D) - Polyp, Colorectal, 4  ascending colonpolyp x 2 cold bx                                          E) - Polyp, Colorectal, 5  cold bx  sigmoid colon polyp                                              F) - Polyp, Colorectal, 6  cold bx   rectal polyp                                          Final Diagnosis   A  Stomach (biopsy):  - Predominately superficial foveolar mucosa with single atypical detached epithelial fragment   - Associated acute inflammatory exudate and rare intestinal mucosa fragment  - No H pylori identified on immunohistochemistry stain  - No fungal organisms identified (Alcian blue/PAS)     Comment:   - The focus of atypia is lost on deeper sections  The differential is reactive atypia versus a dysplastic process  Strongly suggest repeat biopsy if clinically indicated       B  Esophagus (biopsy):  - Esophageal mucosa with no diagnostic abnormality  - No intestinal metaplasia      C  Hepatic flexure polyp (polypectomy):  - Tubulovillous adenoma  - No high grade dysplasia     D  Ascending colon polyp x2 (biopsy):  - Tubular adenoma x2  - No high grade dysplasia      E  Sigmoid colon polyp (biopsy):  - Fragments of hyperplastic polyp  - Negative for dysplasia       F   Rectal polyp (biopsy):  - Hyperplastic polyp  - Negative for dysplasia      Interpretation performed at [facility], [address] [facility] [[email or web address] [address] [facility]         Electronically signed by Eliezer Castro MD on 6/15/2018 at  9:20 AM

## 2018-08-28 NOTE — SEDATION DOCUMENTATION
2100 ml of dark yellow fluid removed  Patient tolerated well and taken by wheelchair to infusion center for albumin

## 2018-08-31 NOTE — PRE-PROCEDURE INSTRUCTIONS
Pre-Surgery Instructions:   Medication Instructions    amLODIPine (NORVASC) 5 mg tablet Patient was instructed by Physician and understands   cetirizine (ZyrTEC) 10 mg tablet Patient was instructed by Physician and understands   esomeprazole (NexIUM) 40 MG capsule Patient was instructed by Physician and understands   furosemide (LASIX) 80 mg tablet Patient was instructed by Physician and understands   glucosamine-chondroitin 500-400 MG tablet Patient was instructed by Physician and understands   ibuprofen (MOTRIN) 400 mg tablet Patient was instructed by Physician and understands   losartan (COZAAR) 100 MG tablet Patient was instructed by Physician and understands   metFORMIN (GLUCOPHAGE) 500 mg tablet Patient was instructed by Physician and understands   potassium chloride (K-DUR,KLOR-CON) 20 mEq tablet Patient was instructed by Physician and understands   pregabalin (LYRICA) 200 MG capsule Patient was instructed by Physician and understands   Probiotic Product (PROBIOTIC-10 ULTIMATE) CAPS Patient was instructed by Physician and understands   varenicline (CHANTIX ROLO) 0 5 MG X 11 & 1 MG X 42 tablet Patient was instructed by Physician and understands  Pt to follow Dr Alcira Subramanian instructions     David Carl

## 2018-09-05 NOTE — OP NOTE
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]    Procedure(s) (LRB):  ESOPHAGOGASTRODUODENOSCOPY (EGD) (N/A) with biopsies     Specimen(s):  ID Type Source Tests Collected by Time Destination   1 : bx gastric ulcerative polypodal lesion Tissue Stomach TISSUE EXAM Alice Seo MD 2018 1159        Estimated Blood Loss:   Minimal       Anesthesia Type:   IV Sedation with Anesthesia    Operative Indications:  Gastric lesion, peritoneal mets       Operative Findings:  EGD- under conscious sedation, upper endoscope was passed through the mouth and upper esophageal sphincter was intubated  Upper, middle and lower esophagus appeared normal   At GE junction, there is a small hiatal hernia  Scope was advanced in the stomach, on retroflexion fundus appeared normal, gastric body appeared normal, in the antrum, there is a large 2-3 cm size polypoidal  ulcerated mass  suspicious for malignancy or  metastatic disease  Multiple biopsies were done  Scope was advanced from pylorus to duodenal bulb which appeared normal, 2nd part of duodenum where duodenal villi appeared normal    Impression-1  Large 3 cm size ulcerated polypoid lesion near the pyloric channel    2  Normal esophagus  3  Normal small bowel villi    Recommendation-1  Follow-up biopsies  2  Follow-up with Dr Cynthia Grant for chemotherapy  3    Continue PPI     Complications:   None        Patient Disposition:  PACU     SIGNATURE: Alice Seo MD  DATE: 2018  TIME: 12:03 PM

## 2018-09-05 NOTE — H&P
History and Physical -  [facility]  Robson Gonzáles 68 y o  female MRN: [id number]    HPI: Robson Gonzáles is a 68y o  year old female who presents with abdominal pain, history of metastatic endometrial cancer, patient had laparoscopy along with a biopsy of the peritoneum and pathology report shows tumor could be GI origin  Patient had a previous EGD which shows gastric lesion and ulceration, biopsy suggests evidence of dysplasia      Review of Systems    Historical Information   Past Medical History:   Diagnosis Date    Abdomen enlarged     had paracentesis-8/28/18-malignant ascites    Arthritis     toes    Cancer ([facility] [address] )     unknown site at time of interview    Cellulitis     Diabetes mellitus ([facility] [address] )     type 2    Diarrhea     Fever     low grade for over a month     History of colonoscopy     History of esophagogastroduodenoscopy (EGD)     Hypertension     Kidney disorder     CT scan showed stones-6/2018    Mass     ovary or uterus    Obesity     Peripheral neuropathy     Stomach ache     intermittent- waves of pain    UTI (urinary tract infection)     Dx on 06/11/18 per the pt post urologist call    Venous insufficiency (chronic) (peripheral)      Past Surgical History:   Procedure Laterality Date    CATARACT EXTRACTION Bilateral     CHOLECYSTECTOMY  1995    lap    COLONOSCOPY N/A 6/13/2018    Procedure: COLONOSCOPY;  Surgeon: Erendira Osman MD;  Location: [facility] GI LAB; Service: Gastroenterology    IR PARACENTESIS  8/28/2018    KNEE ARTHROSCOPY Left     x5    KNEE ARTHROSCOPY Right     x2    KS EGD TRANSORAL BIOPSY SINGLE/MULTIPLE N/A 6/13/2018    Procedure: ESOPHAGOGASTRODUODENOSCOPY (EGD); Surgeon: Erendira Osman MD;  Location: [facility] GI LAB;   Service: Gastroenterology    KS LAP,DIAGNOSTIC ABDOMEN N/A 7/26/2018    Procedure: diagnostic laparoscopy, peritoneal biopsy ;  Surgeon: Nik Rose MD;  Location: [facility] OR;  Service: Gynecology Oncology     Social History History   Alcohol Use    1 2 oz/week    2 Glasses of wine per week     Comment: daily     History   Drug Use No     History   Smoking Status    Former Smoker    Packs/day: 2 00    Years: 50 00    Types: Cigarettes    Quit date: 06/2018   Smokeless Tobacco    Never Used     Family History   Problem Relation Age of Onset    Cancer Mother         breast age 48    Early death Mother     Alcohol abuse Father     Heart disease Father         MI    Cancer Daughter         skin    Hypertension Son     No Known Problems Son        Meds/Allergies     Prescriptions Prior to Admission   Medication    amLODIPine (NORVASC) 5 mg tablet    cetirizine (ZyrTEC) 10 mg tablet    esomeprazole (NexIUM) 40 MG capsule    furosemide (LASIX) 80 mg tablet    glucosamine-chondroitin 500-400 MG tablet    ibuprofen (MOTRIN) 400 mg tablet    losartan (COZAAR) 100 MG tablet    metFORMIN (GLUCOPHAGE) 500 mg tablet    potassium chloride (K-DUR,KLOR-CON) 20 mEq tablet    pregabalin (LYRICA) 200 MG capsule    varenicline (CHANTIX ROLO) 0 5 MG X 11 & 1 MG X 42 tablet    Probiotic Product (PROBIOTIC-10 ULTIMATE) CAPS       Allergies   Allergen Reactions    Sulfa Antibiotics Hives       Objective     Blood pressure 157/88, pulse 95, temperature 97 6 °F (36 4 °C), resp  rate 18, weight 119 kg (263 lb), SpO2 90 %        PHYSICAL EXAM    Gen: NAD  CV: RRR  CHEST: Clear  ABD: soft, NT/ND  EXT: no edema  Neuro: AAO      ASSESSMENT/PLAN:  This is a 68y o  year old female here for elective EGD  PLAN:   Procedure:  Risk and benefit of the procedure was discussed and will proceed under conscious sedation

## 2018-09-05 NOTE — ANESTHESIA PREPROCEDURE EVALUATION
Venous insufficiency    Diabetes mellitus (HCC) type 2   Hypertension    Stomach ache    Fever low grade for over a month    Mass ovary or uterus   Kidney disorder    UTI (urinary tract infection) Dx on 06/11/18 per the pt post urologist call       Review of Systems/Medical History  Patient summary reviewed  Chart reviewed      Cardiovascular  Hypertension on > 1 medication,    Pulmonary  Smoker cigarette smoker  , Tobacco cessation counseling given Cumulative Pack Years: 48,   Comment: Chronic cough and post nasal discharge      GI/Hepatic       Kidney disease ,        Endo/Other  Diabetes well controlled Oral agent,   Obesity  morbid obesity   GYN       Hematology   Musculoskeletal    Comment: Fibromyalgia, primary      Neurology   Psychology     Chronic pain,            Physical Exam    Airway    Mallampati score: II  TM Distance: >3 FB  Neck ROM: full     Dental       Cardiovascular  Rhythm: regular, Rate: normal,     Pulmonary  Wheezes,     Other Findings        Anesthesia Plan  ASA Score- 3     Anesthesia Type- general with ASA Monitors  Additional Monitors:   Airway Plan:         Plan Factors-    Induction- intravenous  Postoperative Plan- Plan for postoperative opioid use  Planned trial extubation    Informed Consent-   I personally reviewed this patient with the CRNA  Discussed and agreed on the Anesthesia Plan with the CRNA  Anaya Uribe

## 2018-09-10 NOTE — DISCHARGE INSTRUCTIONS
Abdominal Paracentesis     Interventional Radiology Department Number: [phone]  WHAT YOU NEED TO KNOW:   Abdominal paracentesis is a procedure to remove abnormal fluid buildup in your abdomen  Fluid builds up because of liver problems, such as swelling and scarring  Heart failure, kidney disease, a mass, or problems with your pancreas may also cause fluid buildup  Before your procedure:   · Vital signs:  Caregivers will check your blood pressure, heart rate, breathing rate, and temperature  They will also ask about your pain  These vital signs give caregivers information about your current health  · Caregivers may insert an intravenous tube (IV) into your vein  A vein in the arm is usually chosen  Through the IV tube, you may be given liquids and medicine  · Pre-op care: You may be asked to urinate in order to empty your bladder  You are taken to the procedure room and moved to a table or bed  You will need to lie on your back or on your side  · Local anesthesia: This medicine makes you more comfortable during your procedure  Local anesthesia is a shot of medicine put into your skin  Local anesthesia is used to numb the procedure area and dull your pain  You may still feel pressure or pushing during the procedure after you get this medicine  During your procedure:   · Your healthcare provider taps on and feels your abdomen to decide where to insert the needle  Your healthcare provider may also use an ultrasound to help decide where to insert the needle  An ultrasound uses sound waves to show pictures of the inside of your abdomen on a TV-like screen  Your healthcare provider cleans your skin and covers the area around the procedure site with a clean sheet  A needle will be inserted into your abdominal cavity  A syringe will be attached to the needle to remove a small amount of ascites fluid   The needle will be removed once your healthcare provider has removed enough fluid for testing  · To remove a larger amount of fluid, a needle is inserted into your abdominal cavity  A catheter (small, thin tube) is attached to the needle and the needle is removed  The catheter tubing will be attached to a suction device (gentle vacuum) to help remove the fluid  The fluid will drain into a container attached to the tubing    When your healthcare provider has pulled enough fluid from your abdomen, he will remove the catheter  Your wound (procedure site) will be covered with a bandage  The ascites fluid may be sent to a lab for tests  After your procedure Do not get out of bed until your healthcare provider says it is okay  When healthcare providers see that you are not having any problems, you may be able to go home  If you are staying in the hospital, you may be taken back to your room  · Blood tests: You may need blood taken to give caregivers information about how your body is working  The blood may be taken from your hand, arm, or IV  · Intravenous fluids and volume expanders: You may need fluids or volume expanders given through your IV after your procedure  These fluids include albumin or saline  Albumin is a protein found in your blood  The IV fluids help prevent a drop in your blood pressure    · If you do not have an abdominal paracentesis, your symptoms may get worse  You may feel short of breath, have abdominal and chest pain, and have trouble moving around  You may not learn why fluid is building up in your abdomen  You may not get proper treatment  You may have bleeding inside your stomach or bowels  Your kidneys may stop working, and your liver problems may get worse  The fluid inside your abdomen may get infected and cause abdominal pain and tiredness  An infection may become life-threatening and you may die  Talk with your healthcare provider if you have questions or concerns about your procedure, condition, or care    · Bandage may be removed the next day and you may shower the next day    · If you have any fever or abdominal pain please notify your MD   · You may call the Radiology nurse at [phone] until 4pm After 4pm you may call your ordering MD

## 2018-09-10 NOTE — SEDATION DOCUMENTATION
Procedure ended, 4400 ml suzanna fluid remove  Pt tolerated without incident  Pt to infusion center for albumin replacement

## 2018-09-10 NOTE — BRIEF OP NOTE (RAD/CATH)
IR PARACENTESIS  Procedure Note    PATIENT NAME: Anaya Dietrich  : 1945  MRN: [id number]     Pre-op Diagnosis:   1  Other ascites      Post-op Diagnosis:   1  Other ascites        Surgeon:   Get Manning MD  Assistants:     No qualified resident was available, Resident is only observing    Estimated Blood Loss:  1 mL  Findings:   Small to moderate amount of anechoic abdominal ascites  Most appropriate window is within the left abdomen  Color-flow imaging demonstrates no evidence of vessel along the projected tract  Successful placement of 5 [employer] catheter needle within the peritoneal fluid with excellent visualization of the needle tip as a crossed into the fluid  Successful removal of 4400 mL suzanna colored ascites  At the completion of the procedure, patient denies abdominal pain, abdominal discomfort, chest pain, shortness of breath, fever, chills, nausea, and vomiting  Patient states she feels improved and less full      Specimens:  None requested    Complications:  Nothing immediately apparent    Anesthesia: Jayy Argueta MD     Date: 9/10/2018  Time: 12:29 PM

## 2018-09-10 NOTE — PROGRESS NOTES
Vascular and Interventional Radiology Pre Procedure Note    Diagnosis:  Carcinomatosis with ascites    Procedure:  Image guided paracentesis for therapeutic purposes    HPI:  61-year-old female with recent diagnosis carcinomatosis and malignancy of unknown etiology  Patient presents for abdominal discomfort and feeling as though she is reaccumulating fluid  Patient denies significant abdominal pain, chest pain, shortness of breath, fever, chills, nausea, and vomiting  Patient states she wanted to come in before the fluid accumulated as much as it did last time when she had more than 5 L removed  Exam:  General:  Awake, alert, oriented x3, no acute distress  Neck:  Soft, supple, nontender  Chest:  Nontender, no deformity  Abdomen:  Soft, obese, nontender, nondistended though difficult exam due to obesity, no guarding, no rebound  Labs:  06/21/2018  Hematology:  WBC 8 2, hemoglobin 13 2, hematocrit 40 5, platelets 065  Chemistry:  Sodium 140, potassium 3 4, chloride 100, carbon dioxide 30, BUN 8, creatinine 0 84, glucose 143  Coagulation:  Not required by department policy    Imaging:  CT abdomen pelvis June 4, 2018 reviewed demonstrating suspected carcinomatosis and small amount of abdominal fluid  Paracentesis August 28, 2018 reviewed demonstrating small amount of fluid  Plan: Will proceed with image guided paracentesis for therapeutic purposes  I discussed the procedure, its details, risks, benefits and alternatives with the patient  All questions were answered  Patient elects to proceed with the procedure  H&P was reviewed

## 2018-09-11 NOTE — PROGRESS NOTES
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  Issues:    1  Malignancy  Patient was recently seen by Dr Mary Ellen Solis, GI  Repeat upper endoscopy apparently demonstrated a large gastric mass  This indeed may be the primary with carcinomatosis  Final pathology results are still pending  If this is the primary, I will have pathology department check her 2 Trent over expression  Patient understands that she has metastatic cancer, likely gastric origin  Treatment is chemotherapy  We discussed port placement  We also discussed the stage (stage IV) and the unlikely chance for complete cure  We discussed palliative treatments, pain control and improvement of quality of life  Patient is in agreement and wishes to proceed with chemotherapy  When the pathology results are final, nursing staff will spend time with patient/  Paracentesis seems to be needed every 2 weeks  This is ongoing  Hopefully when patient begins chemotherapy, the paracentesis needs will decrease  2  GYN bleeding  I discussed the case at length with Dr Nestor Hilliard today  He was aware of the recent issues with GYN bleeding  The differential diagnosis includes additional metastatic disease within the GYN tract (possible but not very common) verses another issue/malignancy  Possibly patient has another primary  If Dr Nestor Hilliard needs to control progressive bleeding, this would require a significant surgical procedure which would delay chemotherapy  We both agree at this point that controlling the metastatic disease elsewhere is more important  Patient will call if the bleeding gets worse  A CBC has been requested  3  Pain control issues    Patient states that her pain right now is 4-5/10; last night it was 9/10  We discussed options  Patient has had Percocet in the past with good relief  We discussed potential side effects and toxicities  Patient will make sure she has stool softeners at home and monitor for constipation  Patient is to return in 1 week but this will change if the above information is available sooner  Patient knows to call the hematology/oncology office if there are any other questions or concerns  Carefully review your medication list and verify that the list is accurate and up-to-date  Please call the hematology/oncology office if there are medications missing from the list, medications on the list that you are not currently taking or if there is a dosage or instruction that is different from how you're taking that medication  Patient goals and areas of care:  Cancer origin workup  Patient is able to self-care   ______________________________________________________________________________________    Chief Complaint   Patient presents with    Follow-up     Gastric mass, carcinomatosis     History of Present Illness:    76-year-old female with a complicated recent cancer history  Mrs Ashley Dueñas began to have abdominal pain and ascites approximately 3 months ago  Patient has been seen by GI and GYN  Recent peritoneal biopsy demonstrated adenocarcinoma of GI origin  Presently Mrs Ashley Dueñas states feeling +/-  Pain control continues to be an issue  Patient needs paracentesis approximately every 2 weeks  Appetite is +/- but no nausea vomiting  No fevers, chills or sweats  Patient continues to have GYN spotting, more than before  Fatigue is the same as before  Activities are extremely limited  Review of Systems   Constitutional: Positive for fatigue, fever and unexpected weight change  HENT: Negative  Eyes: Negative  Respiratory: Negative  Cardiovascular: Positive for leg swelling     Gastrointestinal: Positive for abdominal distention and abdominal pain  Endocrine: Negative  Genitourinary: Positive for vaginal bleeding  Musculoskeletal: Negative  Skin: Negative  Allergic/Immunologic: Negative  Neurological: Negative  Hematological: Negative  Psychiatric/Behavioral: Negative  All other systems reviewed and are negative  Patient Active Problem List   Diagnosis    Elevated CA-125    Endometrial mass    Cardiopathy    Nephrolithiasis    Chronic venous insufficiency    Smoking    Generalized edema    History of recurrent UTIs    S/P laparoscopic procedure    Peritoneal carcinomatosis (HCC)    Malignant ascites     Past Medical History:   Diagnosis Date    Abdomen enlarged     had paracentesis-8/28/18-malignant ascites    Arthritis     toes    Cancer ([facility] [address] )     unknown site at time of interview    Cellulitis     Diabetes mellitus ([facility] [address] )     type 2    Diarrhea     Fever     low grade for over a month     History of colonoscopy     History of esophagogastroduodenoscopy (EGD)     Hypertension     Kidney disorder     CT scan showed stones-6/2018    Mass     ovary or uterus    Obesity     Peripheral neuropathy     Stomach ache     intermittent- waves of pain    UTI (urinary tract infection)     Dx on 06/11/18 per the pt post urologist call    Venous insufficiency (chronic) (peripheral)      Past Surgical History:   Procedure Laterality Date    CATARACT EXTRACTION Bilateral     CHOLECYSTECTOMY  1995    lap    COLONOSCOPY N/A 6/13/2018    Procedure: COLONOSCOPY;  Surgeon: Delphine Brown MD;  Location: [place] [address] GI LAB; Service: Gastroenterology    IR PARACENTESIS  8/28/2018    IR PARACENTESIS  9/10/2018    KNEE ARTHROSCOPY Left     x5    KNEE ARTHROSCOPY Right     x2    TN EGD TRANSORAL BIOPSY SINGLE/MULTIPLE N/A 6/13/2018    Procedure: ESOPHAGOGASTRODUODENOSCOPY (EGD); Surgeon: Delphine Brown MD;  Location: [facility] GI LAB;   Service: Gastroenterology    TN EGD TRANSORAL BIOPSY SINGLE/MULTIPLE N/A 9/5/2018    Procedure: ESOPHAGOGASTRODUODENOSCOPY (EGD); Surgeon: Mary Lou Cifuentes MD;  Location: [facility] GI LAB; Service: Gastroenterology    MI LAP,DIAGNOSTIC ABDOMEN N/A 7/26/2018    Procedure: diagnostic laparoscopy, peritoneal biopsy ;  Surgeon: Modesto Elizalde MD;  Location:  MAIN OR;  Service: Gynecology Oncology     Family History   Problem Relation Age of Onset    Cancer Mother         breast age 48    Early death Mother    Tommie Ni Alcohol abuse Father     Heart disease Father         MI    Cancer Daughter         skin    Hypertension Son     No Known Problems Son      Social History     Social History    Marital status: /Civil Union     Spouse name: N/A    Number of children: N/A    Years of education: N/A     Occupational History    Not on file       Social History Main Topics    Smoking status: Former Smoker     Packs/day: 2 00     Years: 50 00     Types: Cigarettes     Quit date: 06/2018    Smokeless tobacco: Never Used    Alcohol use 1 2 oz/week     2 Glasses of wine per week      Comment: daily    Drug use: No    Sexual activity: Not on file     Other Topics Concern    Not on file     Social History Narrative    No narrative on file       Current Outpatient Prescriptions:     amLODIPine (NORVASC) 5 mg tablet, Take 5 mg by mouth every morning, Disp: , Rfl:     cetirizine (ZyrTEC) 10 mg tablet, Take 10 mg by mouth daily at bedtime, Disp: , Rfl:     esomeprazole (NexIUM) 40 MG capsule, 40 mg every morning  , Disp: , Rfl: 0    furosemide (LASIX) 80 mg tablet, Take 80 mg by mouth every morning  , Disp: , Rfl:     glucosamine-chondroitin 500-400 MG tablet, Take 1 tablet by mouth 2 (two) times a day  , Disp: , Rfl:     ibuprofen (MOTRIN) 400 mg tablet, Take 200 mg by mouth every 6 (six) hours as needed for mild pain  , Disp: , Rfl:     losartan (COZAAR) 100 MG tablet, Take 100 mg by mouth every morning, Disp: , Rfl:     metFORMIN (GLUCOPHAGE) 500 mg tablet, Take 500 mg by mouth 2 (two) times a day with meals, Disp: , Rfl:     potassium chloride (K-DUR,KLOR-CON) 20 mEq tablet, TK 1 T PO  D, Disp: , Rfl: 3    pregabalin (LYRICA) 200 MG capsule, Take 200 mg by mouth 2 (two) times a day, Disp: , Rfl:     Probiotic Product (DAILY PROBIOTIC PO), Take by mouth, Disp: , Rfl:     varenicline (CHANTIX ROLO) 0 5 MG X 11 & 1 MG X 42 tablet, Take one 0 5mg tab by mouth 1x daily for 3 days, then increase to one 0 5mg tab 2x daily for 3 days, then increase to one 1mg tab 2x daily (Patient taking differently: 2 (two) times a day Take one 0 5mg tab by mouth 1x daily for 3 days, then increase to one 0 5mg tab 2x daily for 3 days, then increase to one 1mg tab 2x daily ), Disp: 53 tablet, Rfl: 0    Probiotic Product (PROBIOTIC-10 ULTIMATE) CAPS, Take by mouth as needed  , Disp: , Rfl:   No current facility-administered medications for this visit  Allergies   Allergen Reactions    Sulfa Antibiotics Hives       Vitals:    09/11/18 1045   BP: 120/78   Pulse: 88   Resp: 18   Temp: 98 2 °F (36 8 °C)   SpO2: 95%     Physical Exam   Constitutional: She is oriented to person, place, and time  She appears well-developed and well-nourished  Obese female, no respiratory distress, anxious   HENT:   Head: Normocephalic and atraumatic  Right Ear: External ear normal    Left Ear: External ear normal    Nose: Nose normal    Mouth/Throat: Oropharynx is clear and moist    Eyes: Conjunctivae and EOM are normal  Pupils are equal, round, and reactive to light  Neck: Normal range of motion  Neck supple  Cardiovascular: Normal rate, regular rhythm, normal heart sounds and intact distal pulses  Pulmonary/Chest: Effort normal and breath sounds normal    +rales at bases bilaterally   Abdominal: Soft   Bowel sounds are normal    Distended significantly, cannot palpate any organ, +bowel sounds, nontender, left upper quadrant scabbed (presumed from laparoscopic procedure)   Musculoskeletal: Normal range of motion  Neurological: She is alert and oriented to person, place, and time  She has normal reflexes  Skin: Skin is warm  Relatively good color, warm, moist, no petechiae or ecchymoses   Psychiatric: She has a normal mood and affect  Her behavior is normal  Judgment and thought content normal    Extremities:  3+ bilateral lower extremity pitting edema, no obvious cords, pulses = 0 bilaterally, no obvious signs of cellulitis but both legs are red  Lymphatics:  Or axilla bilaterally No adenopathy in the neck, supraclavicular region    Labs:    09/07/2018 CEA = 57 2  = 33 7    07/09/2018 WBC = 6 8 hemoglobin = 12 6 hematocrit = 30 1 platelet = 673 neutrophil = 63% lymphocytes = 23% monocyte = 10% BUN = 17 creatinine = 0 92 calcium = 9 3 total bilirubin = 0 3 alkaline phosphatase = 60 AST = 13 ALT = 10     07/06/2018 chest x-ray:  Impression stated no acute cardiopulmonary disease  Imaging    08/30/2018 PET-CT impression stated small amount of abdominal and pelvic ascites with diffuse infiltration of the mesenteric fat  There appears to be some nodularity of the mesentery suggesting carcinomatosis, SUV = 4 5 diffusely  There was hypermetabolic activity scattered throughout the bowel without any single focus lesion appreciated  No evidence of hypermetabolic activity in the bones, neck or chest   Patient had multiple calcified bilateral renal stones, mild left hydronephrosis of uncertain etiology  06/25/2018 pelvic ultrasound    1  Enlarged heterogeneous uterus  2   Thickened endometrium with at least one endometrial polyp  3   Mass arising from the posterior uterus on the left versus a left adnexal mass  4   Pelvic ascites  Findings are concerning for pelvic malignancy either arising from the uterus or the left ovary  Surgical evaluation recommended  If clinically appropriate, pelvic MRI may provide additional information      06/04/2018 CT scan abdomen/pelvis    Heterogeneous appearance of the myometrium with thickening of the endometrial stripe  There is a complex appearing lesion along the posterior left uterine fundus measuring approximately 5 5 x 4 7 x 3 6 cm in size      In addition, small volume ascites is seen  Some stranding and nodular thickening of the anterior mesenteric fat is seen in the upper and mid abdomen (for example, axial images 37 through 52)  Given the findings taken together, the diagnosis of exclusion is neoplasm which could be uterine or ovarian with associated peritoneal metastatic disease  Correlation with the patient's symptoms and laboratory values recommended  Pelvic ultrasound and/or pelvic MR with and without contrast may be of benefit for further evaluation      Bilateral nephrolithiasis  Mild hydroureteronephrosis on the left which could be related to ureteral extrinsic compression or ureteral involvement in the left pelvis (axial image 66)     Right adrenal nodule, left adrenal thickening, body wall edema, and other findings as above    Pathology    Case Report   Surgical Pathology Report                         Case: [id number]                                    Authorizing Provider: Shelton Wilkes MD      Collected:           07/26/2018 0905               Ordering Location:     [facility]      Received:            07/26/2018 [facility]                                      Hospital Operating Room                                                       Pathologist:           Pinky Schroeder MD                                                                 Specimen:    Peritoneum, biopsy                                                                         Final Diagnosis   A  Peritoneum (biopsy):     - Adenocarcinoma with intestinal differentiation      Comment:  Clinical / radiologic correlation is required  The differential includes gastrointestinal vs  gynecologic with intestinal differentiation     Electronically signed by Pinky Schroeder MD on 8/1/2018 at 10:28 AM   Preliminary result electronically signed by Estrellita Rios MD on 7/30/2018 at 11:47 AM   Microscopic Description   - Immunohistochemical studies (with appropriate controls) demonstrate:     - Positive: CK20, CDX2, CK7 (focal)     - Negative: TTF1, YASMEEN-3, GA, WT1, Cowen-8, calretinin, CA-125, P16, P53 (wild-type), ER, CD56, synaptophysin     - Proliferation index (Ki-67): Patchy elevation, reaching 60-70% in areas      Comments:          Case Report   Surgical Pathology Report                         Case: [id number]                                    Authorizing Provider: Alice Read MD      Collected:           06/28/2018 1252               Ordering Location:     [facility] Received:            06/28/2018 1252                                      [facility]                                                            Pathologist:           Silvio Terry MD                                                                Specimen:    Endometrium                                                                                Final Diagnosis   A  Endometrium (curettage): - Extremely scant benign atrophic endometrium with focal atypia and associated blood     Comment:   - Suggest repeat biopsy due to the scant nature of the specimen and focal atypia  The focal atypia may represent surface metaplasia, however, a malignant process cannot be excluded     - Features of polyp are not identified in this material      Electronically signed by Silvio Terry MD on 7/2/2018 at  1:32 PM     Case Report   Surgical Pathology Report                         Case: [id number]                                    Authorizing Provider: Sheila Elizabeth MD           Collected:           06/13/2018 0929               Ordering Location:     [facility]   Received:            06/13/2018 1701                                      [place]                                                                        Pathologist:           Eyad May MD                                                                Specimens:   A) - Stomach, 1   cold bx  antum, ulcerative area                                                    B) - Esophagus, 2  cold bx  lower esophagus                                                          C) - Polyp, Colorectal, 3  hot snare hepatic flexure polyp                                           D) - Polyp, Colorectal, 4  ascending colonpolyp x 2 cold bx                                          E) - Polyp, Colorectal, 5  cold bx  sigmoid colon polyp                                              F) - Polyp, Colorectal, 6  cold bx   rectal polyp                                          Final Diagnosis   A  Stomach (biopsy):  - Predominately superficial foveolar mucosa with single atypical detached epithelial fragment   - Associated acute inflammatory exudate and rare intestinal mucosa fragment  - No H pylori identified on immunohistochemistry stain  - No fungal organisms identified (Alcian blue/PAS)     Comment:   - The focus of atypia is lost on deeper sections  The differential is reactive atypia versus a dysplastic process  Strongly suggest repeat biopsy if clinically indicated       B  Esophagus (biopsy):  - Esophageal mucosa with no diagnostic abnormality  - No intestinal metaplasia      C  Hepatic flexure polyp (polypectomy):  - Tubulovillous adenoma  - No high grade dysplasia     D  Ascending colon polyp x2 (biopsy):  - Tubular adenoma x2  - No high grade dysplasia      E  Sigmoid colon polyp (biopsy):  - Fragments of hyperplastic polyp  - Negative for dysplasia       F   Rectal polyp (biopsy):  - Hyperplastic polyp  - Negative for dysplasia      Interpretation performed at [facility], [address] [facility], [address] [facility]         Electronically signed by Eyad May MD on 6/15/2018 at  9:20 AM

## 2018-09-14 NOTE — PROGRESS NOTES
Patient recently underwent a gastric biopsy the demonstrated adenocarcinoma, consistent with the adenocarcinoma found on a prior peritoneal biopsy  The workup is now complete consistent with metastatic gastric adenocarcinoma  This was discussed at length with patient and  over the phone today  The plan is for port placement, nursing visit to discuss chemotherapy and then begin treatment  Pathology department will perform her 2 Trent  Paracentesis is ongoing as needed  Patient denied any pain control issues

## 2018-09-18 NOTE — TELEPHONE ENCOUNTER
Script was faxed to [facility] IR dept, they will contact pt to rhoda appt

## 2018-09-18 NOTE — TELEPHONE ENCOUNTER
Please schedule pt for port placement  She has appt with dr Marcia Ivy on Thursday and will review treatment plan at that time with her   thanks

## 2018-09-20 NOTE — TELEPHONE ENCOUNTER
Spoke to Dr Benito Grigsby  He will not do a telephone conference  The pt is rhoda for a paracentesis @ 10 and the infusion center @ 11 for albumin  If the pt wants to wait at the infusion when she is done, Dr Benito Grigsby will stop in to see her after his morning office hours are completed and before he starts his hospital rounding  Spoke to Gavino Lima, they will stay and wait at the infusion center

## 2018-09-21 NOTE — BRIEF OP NOTE (RAD/CATH)
IR PARACENTESIS  Procedure Note    PATIENT NAME: Maria Elena Arthur  : 1945  MRN: [id number]     Pre-op Diagnosis:   1  Ascites      Post-op Diagnosis:   1   Ascites        Surgeon:   Braden Khan MD    Estimated Blood Loss: Minimal    Findings: Successful paracentesis with 5 5 L suzanna colored ascites fluid removed    Specimens: None    Complications:  None    Anesthesia: Local    Braden Khan MD     Date: 2018  Time: 10:48 AM

## 2018-09-21 NOTE — H&P
IR H&P    HPI:  68year old female with history of adenocarcinoma of GI origin presents with recurrent ascites  PMH:  Adenocarcinoma of GI origin  DM II  HTN    PSH:  Cholecystectomy  Cataract surgery    Vitals:    09/21/18 1017   Pulse: 88   SpO2: 94%     Gen: NAD  Abd: Distended    Lab Results   Component Value Date    WBC 7 7 09/13/2018    HGB 11 9 09/13/2018    HCT 35 5 09/13/2018    MCV 91 09/13/2018     09/13/2018     Lab Results   Component Value Date     06/21/2018    K 3 4 (L) 06/21/2018    CL 95 (L) 09/13/2018    CO2 26 09/13/2018    BUN 11 09/13/2018    CREATININE 0 93 09/13/2018    GLUF 143 (H) 06/21/2018    CALCIUM 9 2 06/21/2018    EGFR 70 06/21/2018     A:  68year old female with history of adenocarcinoma of GI origin presents with recurrent ascites      P:  - Paracentesis

## 2018-09-26 NOTE — INTERVAL H&P NOTE
Update:     Patient with metastatic gastric cancer presents for port placement  She is in good spirits, however she cannot lay flat due to recurrent ascites  We will proceed with paracentesis for patient comfort, then 2 flat positioning for port placement  I discussed the risks and benefits, including bleeding, port infection, device malfunction  She wishes to proceed  Sulfa allergy noted  We will infuse albumin during port placement  MP 2 ASA 3    Patient re-evaluated   Accept as history and physical     Marian Klinefelter, MD/September 26, 2018/11:07 AM

## 2018-09-26 NOTE — BRIEF OP NOTE (RAD/CATH)
IR PORT PLACEMENT  Procedure Note  IR PARACENTESIS    PATIENT NAME: Kat Dorsey  : 1945  MRN: [id number]     Pre-op Diagnosis:   1  Malignant neoplasm of stomach, unspecified location [facility])      Post-op Diagnosis:   1   Malignant neoplasm of stomach, unspecified location [facility])        Surgeon:   Joshua Cazares MD  Assistants:     Estimated Blood Loss: minimal  Findings:     Successful placement of a right chest 8 [place]  Power injectable port    Paracentesis with removal of 3 L of fluid    Specimens:  None    Complications:  None immediate    Anesthesia: Conscious sedation    Joshua Cazares MD     Date: 2018  Time: 12:31 PM

## 2018-09-26 NOTE — DISCHARGE INSTRUCTIONS
Implanted Venous Access Port   WHAT YOU NEED TO KNOW:   An implanted venous access port is a device used to give treatments and take blood  It may also be called a central venous access device (CVAD)  The port is a small container that is placed under your skin, usually in your upper chest  A port can also be placed in your arm or abdomen  The port is attached to a catheter that enters a large vein  DISCHARGE INSTRUCTIONS:    Do not drive the day of procedure  Rest with limited activity the day of procedure  Remove gauze dressing 24 hours after procedure  Skin glue was used over your incisions, it will last 10-14 days  Do not pick at skin glue, if it starts to come up on the ends you may cut with a scissor  You may shower 48 hours after procedure  Pat skin around incision dry after shower  Prevent an infection:   Wash your hands often    · Check your skin for infection every day  Look for redness, swelling, or fluid oozing from the port site  · Take your medicine as directed  Contact your healthcare provider if you think your medicine is not helping or if you have side effects  Tell him if you are allergic to any medicine  Keep a list of the medicines, vitamins, and herbs you take  Include the amounts, and when and why you take them  Bring the list or the pill bottles to follow-up visits  Carry your medicine list with you in case of an emergency  Follow up with your healthcare provider as directed,  Write down your questions so you remember to ask them during your visits  Implanted venous access information card: Your healthcare provider will give you a card with information about your port type  Keep this information in a safe place that is easy to find  Activity:  You may return to your daily activities when the area heals  No bath or swimming until incision is healed, at least 2 weeks  Contact your healthcare provider if:   · You have a fever      · You run out of supplies to care for your skin or port  · Your port site is red, swollen, or draining pus  · Your port site turns cold, changes color, or you cannot feel it  · The veins in your neck or chest bulge  · You have trouble using your port  · You have questions or concerns about your condition or care  Seek care immediately or call 911 if:  · Blood soaks through your bandage  · You hear a bubbling noise when your port is flushed  · The skin over or around your port breaks open  · Your heart is jumping or fluttering  · You have a headache, blurred vision, and feel confused  · You have pain in your arm, neck, shoulder, or chest     · You have trouble breathing that is getting worse over time        [facility]  [phone]      Moderate Sedation   WHAT YOU NEED TO KNOW:   Moderate sedation, or conscious sedation, is medicine used during procedures to help you feel relaxed and calm  You will be awake and able to follow directions without anxiety or pain  You will remember little to none of the procedure  You may feel tired, weak, or unsteady on your feet after you get sedation  You may also have trouble concentrating or short-term memory loss  These symptoms should go away in 24 hours or less  DISCHARGE INSTRUCTIONS:   Call 911 or have someone else call for any of the following:   · You have sudden trouble breathing  · You cannot be woken  Seek care immediately if:   · You have a severe headache or dizziness  · Your heart is beating faster than usual   Contact your healthcare provider if:   · You have a fever  · You have nausea or are vomiting for more than 8 hours after the procedure  · Your skin is itchy, swollen, or you have a rash  · You have questions or concerns about your condition or care  Self-care:   · Have someone stay with you for 24 hours  This person can drive you to errands and help you do things around the house   This person can also watch for problems  · Rest and do quiet activities for 24 hours  Do not exercise, ride a bike, or play sports  Stand up slowly to prevent dizziness and falls  Take short walks around the house with another person  Slowly return to your usual activities the next day  · Do not drive or use dangerous machines or tools for 24 hours  You may injure yourself or others  Examples include a lawnmower, saw, or drill  Do not return to work for 24 hours if you use dangerous machines or tools for work  · Do not make important decisions for 24 hours  For example, do not sign important papers or invest money  · Drink liquids as directed  Liquids help flush the sedation medicine out of your body  Ask how much liquid to drink each day and which liquids are best for you  · Eat small, frequent meals to prevent nausea and vomiting  Start with clear liquids such as juice or broth  If you do not vomit after clear liquids, you can eat your usual foods  · Do not drink alcohol or take medicines that make you drowsy  This includes medicines that help you sleep and anxiety medicines  Ask your healthcare provider if it is safe for you to take pain medicine  Follow up with your healthcare provider as directed:  Write down your questions so you remember to ask them during your visits  © 2017 [facility] Information is for End User's use only and may not be sold, redistributed or otherwise used for commercial purposes  All illustrations and images included in CareNotes® are the copyrighted property of [employer]  or Gregory Odonnell  The above information is an  only  It is not intended as medical advice for individual conditions or treatments  Talk to your doctor, nurse or pharmacist before following any medical regimen to see if it is safe and effective for you

## 2018-09-26 NOTE — H&P (VIEW-ONLY)
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  Issues:    1  Malignancy  Patient was recently seen by Dr Praneeth Kaba, GI  Repeat upper endoscopy apparently demonstrated a large gastric mass  This indeed may be the primary with carcinomatosis  Final pathology results are still pending  If this is the primary, I will have pathology department check her 2 Tretn over expression  Patient understands that she has metastatic cancer, likely gastric origin  Treatment is chemotherapy  We discussed port placement  We also discussed the stage (stage IV) and the unlikely chance for complete cure  We discussed palliative treatments, pain control and improvement of quality of life  Patient is in agreement and wishes to proceed with chemotherapy  When the pathology results are final, nursing staff will spend time with patient/  Paracentesis seems to be needed every 2 weeks  This is ongoing  Hopefully when patient begins chemotherapy, the paracentesis needs will decrease  2  GYN bleeding  I discussed the case at length with Dr Derick Higginbotham today  He was aware of the recent issues with GYN bleeding  The differential diagnosis includes additional metastatic disease within the GYN tract (possible but not very common) verses another issue/malignancy  Possibly patient has another primary  If Dr Derick Higginbotham needs to control progressive bleeding, this would require a significant surgical procedure which would delay chemotherapy  We both agree at this point that controlling the metastatic disease elsewhere is more important  Patient will call if the bleeding gets worse  A CBC has been requested  3  Pain control issues    Patient states that her pain right now is 4-5/10; last night it was 9/10  We discussed options  Patient has had Percocet in the past with good relief  We discussed potential side effects and toxicities  Patient will make sure she has stool softeners at home and monitor for constipation  Patient is to return in 1 week but this will change if the above information is available sooner  Patient knows to call the hematology/oncology office if there are any other questions or concerns  Carefully review your medication list and verify that the list is accurate and up-to-date  Please call the hematology/oncology office if there are medications missing from the list, medications on the list that you are not currently taking or if there is a dosage or instruction that is different from how you're taking that medication  Patient goals and areas of care:  Cancer origin workup  Patient is able to self-care   ______________________________________________________________________________________    Chief Complaint   Patient presents with    Follow-up     Gastric mass, carcinomatosis     History of Present Illness:    78-year-old female with a complicated recent cancer history  Mrs Landy Quinteros began to have abdominal pain and ascites approximately 3 months ago  Patient has been seen by GI and GYN  Recent peritoneal biopsy demonstrated adenocarcinoma of GI origin  Presently Mrs Landy Quinteros states feeling +/-  Pain control continues to be an issue  Patient needs paracentesis approximately every 2 weeks  Appetite is +/- but no nausea vomiting  No fevers, chills or sweats  Patient continues to have GYN spotting, more than before  Fatigue is the same as before  Activities are extremely limited  Review of Systems   Constitutional: Positive for fatigue, fever and unexpected weight change  HENT: Negative  Eyes: Negative  Respiratory: Negative  Cardiovascular: Positive for leg swelling     Gastrointestinal: Positive for abdominal distention and abdominal pain  Endocrine: Negative  Genitourinary: Positive for vaginal bleeding  Musculoskeletal: Negative  Skin: Negative  Allergic/Immunologic: Negative  Neurological: Negative  Hematological: Negative  Psychiatric/Behavioral: Negative  All other systems reviewed and are negative  Patient Active Problem List   Diagnosis    Elevated CA-125    Endometrial mass    Cardiopathy    Nephrolithiasis    Chronic venous insufficiency    Smoking    Generalized edema    History of recurrent UTIs    S/P laparoscopic procedure    Peritoneal carcinomatosis (HCC)    Malignant ascites     Past Medical History:   Diagnosis Date    Abdomen enlarged     had paracentesis-8/28/18-malignant ascites    Arthritis     toes    Cancer ([facility] [address] )     unknown site at time of interview    Cellulitis     Diabetes mellitus ([facility] [address] )     type 2    Diarrhea     Fever     low grade for over a month     History of colonoscopy     History of esophagogastroduodenoscopy (EGD)     Hypertension     Kidney disorder     CT scan showed stones-6/2018    Mass     ovary or uterus    Obesity     Peripheral neuropathy     Stomach ache     intermittent- waves of pain    UTI (urinary tract infection)     Dx on 06/11/18 per the pt post urologist call    Venous insufficiency (chronic) (peripheral)      Past Surgical History:   Procedure Laterality Date    CATARACT EXTRACTION Bilateral     CHOLECYSTECTOMY  1995    lap    COLONOSCOPY N/A 6/13/2018    Procedure: COLONOSCOPY;  Surgeon: Devon Mendieta MD;  Location: [place] [address] GI LAB; Service: Gastroenterology    IR PARACENTESIS  8/28/2018    IR PARACENTESIS  9/10/2018    KNEE ARTHROSCOPY Left     x5    KNEE ARTHROSCOPY Right     x2    IN EGD TRANSORAL BIOPSY SINGLE/MULTIPLE N/A 6/13/2018    Procedure: ESOPHAGOGASTRODUODENOSCOPY (EGD); Surgeon: Devon Mendieta MD;  Location: [facility] GI LAB;   Service: Gastroenterology    IN EGD TRANSORAL BIOPSY SINGLE/MULTIPLE N/A 9/5/2018    Procedure: ESOPHAGOGASTRODUODENOSCOPY (EGD); Surgeon: Becca Ware MD;  Location: [facility] GI LAB; Service: Gastroenterology    NC LAP,DIAGNOSTIC ABDOMEN N/A 7/26/2018    Procedure: diagnostic laparoscopy, peritoneal biopsy ;  Surgeon: Jesus Delgado MD;  Location:  MAIN OR;  Service: Gynecology Oncology     Family History   Problem Relation Age of Onset    Cancer Mother         breast age 48    Early death Mother    Ariana Wong Alcohol abuse Father     Heart disease Father         MI    Cancer Daughter         skin    Hypertension Son     No Known Problems Son      Social History     Social History    Marital status: /Civil Union     Spouse name: N/A    Number of children: N/A    Years of education: N/A     Occupational History    Not on file       Social History Main Topics    Smoking status: Former Smoker     Packs/day: 2 00     Years: 50 00     Types: Cigarettes     Quit date: 06/2018    Smokeless tobacco: Never Used    Alcohol use 1 2 oz/week     2 Glasses of wine per week      Comment: daily    Drug use: No    Sexual activity: Not on file     Other Topics Concern    Not on file     Social History Narrative    No narrative on file       Current Outpatient Prescriptions:     amLODIPine (NORVASC) 5 mg tablet, Take 5 mg by mouth every morning, Disp: , Rfl:     cetirizine (ZyrTEC) 10 mg tablet, Take 10 mg by mouth daily at bedtime, Disp: , Rfl:     esomeprazole (NexIUM) 40 MG capsule, 40 mg every morning  , Disp: , Rfl: 0    furosemide (LASIX) 80 mg tablet, Take 80 mg by mouth every morning  , Disp: , Rfl:     glucosamine-chondroitin 500-400 MG tablet, Take 1 tablet by mouth 2 (two) times a day  , Disp: , Rfl:     ibuprofen (MOTRIN) 400 mg tablet, Take 200 mg by mouth every 6 (six) hours as needed for mild pain  , Disp: , Rfl:     losartan (COZAAR) 100 MG tablet, Take 100 mg by mouth every morning, Disp: , Rfl:     metFORMIN (GLUCOPHAGE) 500 mg tablet, Take 500 mg by mouth 2 (two) times a day with meals, Disp: , Rfl:     potassium chloride (K-DUR,KLOR-CON) 20 mEq tablet, TK 1 T PO  D, Disp: , Rfl: 3    pregabalin (LYRICA) 200 MG capsule, Take 200 mg by mouth 2 (two) times a day, Disp: , Rfl:     Probiotic Product (DAILY PROBIOTIC PO), Take by mouth, Disp: , Rfl:     varenicline (CHANTIX ROLO) 0 5 MG X 11 & 1 MG X 42 tablet, Take one 0 5mg tab by mouth 1x daily for 3 days, then increase to one 0 5mg tab 2x daily for 3 days, then increase to one 1mg tab 2x daily (Patient taking differently: 2 (two) times a day Take one 0 5mg tab by mouth 1x daily for 3 days, then increase to one 0 5mg tab 2x daily for 3 days, then increase to one 1mg tab 2x daily ), Disp: 53 tablet, Rfl: 0    Probiotic Product (PROBIOTIC-10 ULTIMATE) CAPS, Take by mouth as needed  , Disp: , Rfl:   No current facility-administered medications for this visit  Allergies   Allergen Reactions    Sulfa Antibiotics Hives       Vitals:    09/11/18 1045   BP: 120/78   Pulse: 88   Resp: 18   Temp: 98 2 °F (36 8 °C)   SpO2: 95%     Physical Exam   Constitutional: She is oriented to person, place, and time  She appears well-developed and well-nourished  Obese female, no respiratory distress, anxious   HENT:   Head: Normocephalic and atraumatic  Right Ear: External ear normal    Left Ear: External ear normal    Nose: Nose normal    Mouth/Throat: Oropharynx is clear and moist    Eyes: Conjunctivae and EOM are normal  Pupils are equal, round, and reactive to light  Neck: Normal range of motion  Neck supple  Cardiovascular: Normal rate, regular rhythm, normal heart sounds and intact distal pulses  Pulmonary/Chest: Effort normal and breath sounds normal    +rales at bases bilaterally   Abdominal: Soft   Bowel sounds are normal    Distended significantly, cannot palpate any organ, +bowel sounds, nontender, left upper quadrant scabbed (presumed from laparoscopic procedure)   Musculoskeletal: Normal range of motion  Neurological: She is alert and oriented to person, place, and time  She has normal reflexes  Skin: Skin is warm  Relatively good color, warm, moist, no petechiae or ecchymoses   Psychiatric: She has a normal mood and affect  Her behavior is normal  Judgment and thought content normal    Extremities:  3+ bilateral lower extremity pitting edema, no obvious cords, pulses = 0 bilaterally, no obvious signs of cellulitis but both legs are red  Lymphatics:  Or axilla bilaterally No adenopathy in the neck, supraclavicular region    Labs:    09/07/2018 CEA = 57 2  = 33 7    07/09/2018 WBC = 6 8 hemoglobin = 12 6 hematocrit = 30 1 platelet = 192 neutrophil = 63% lymphocytes = 23% monocyte = 10% BUN = 17 creatinine = 0 92 calcium = 9 3 total bilirubin = 0 3 alkaline phosphatase = 60 AST = 13 ALT = 10     07/06/2018 chest x-ray:  Impression stated no acute cardiopulmonary disease  Imaging    08/30/2018 PET-CT impression stated small amount of abdominal and pelvic ascites with diffuse infiltration of the mesenteric fat  There appears to be some nodularity of the mesentery suggesting carcinomatosis, SUV = 4 5 diffusely  There was hypermetabolic activity scattered throughout the bowel without any single focus lesion appreciated  No evidence of hypermetabolic activity in the bones, neck or chest   Patient had multiple calcified bilateral renal stones, mild left hydronephrosis of uncertain etiology  06/25/2018 pelvic ultrasound    1  Enlarged heterogeneous uterus  2   Thickened endometrium with at least one endometrial polyp  3   Mass arising from the posterior uterus on the left versus a left adnexal mass  4   Pelvic ascites  Findings are concerning for pelvic malignancy either arising from the uterus or the left ovary  Surgical evaluation recommended  If clinically appropriate, pelvic MRI may provide additional information      06/04/2018 CT scan abdomen/pelvis    Heterogeneous appearance of the myometrium with thickening of the endometrial stripe  There is a complex appearing lesion along the posterior left uterine fundus measuring approximately 5 5 x 4 7 x 3 6 cm in size      In addition, small volume ascites is seen  Some stranding and nodular thickening of the anterior mesenteric fat is seen in the upper and mid abdomen (for example, axial images 37 through 52)  Given the findings taken together, the diagnosis of exclusion is neoplasm which could be uterine or ovarian with associated peritoneal metastatic disease  Correlation with the patient's symptoms and laboratory values recommended  Pelvic ultrasound and/or pelvic MR with and without contrast may be of benefit for further evaluation      Bilateral nephrolithiasis  Mild hydroureteronephrosis on the left which could be related to ureteral extrinsic compression or ureteral involvement in the left pelvis (axial image 66)     Right adrenal nodule, left adrenal thickening, body wall edema, and other findings as above    Pathology    Case Report   Surgical Pathology Report                         Case: [id number]                                    Authorizing Provider: Rm Mak MD      Collected:           07/26/2018 0905               Ordering Location:     [facility]      Received:            07/26/2018 1029                                      Hospital Operating Room                                                       Pathologist:           Mary Barrow MD                                                                 Specimen:    Peritoneum, biopsy                                                                         Final Diagnosis   A  Peritoneum (biopsy):     - Adenocarcinoma with intestinal differentiation      Comment:  Clinical / radiologic correlation is required  The differential includes gastrointestinal vs  gynecologic with intestinal differentiation     Electronically signed by Mary Barrow MD on 8/1/2018 at 10:28 AM   Preliminary result electronically signed by Arlyn Ferris MD on 7/30/2018 at 11:47 AM   Microscopic Description   - Immunohistochemical studies (with appropriate controls) demonstrate:     - Positive: CK20, CDX2, CK7 (focal)     - Negative: TTF1, YASMEEN-3, ND, WT1, [place]-8, calretinin, CA-125, P16, P53 (wild-type), ER, CD56, synaptophysin     - Proliferation index (Ki-67): Patchy elevation, reaching 60-70% in areas      Comments:          Case Report   Surgical Pathology Report                         Case: [id number]                                    Authorizing Provider: Nicole Fernandez MD      Collected:           06/28/2018 1252               Ordering Location:     [facility] Received:            06/28/2018 1252                                      [facility] [place]                                                            Pathologist:           Sherice Solorzano MD                                                                Specimen:    Endometrium                                                                                Final Diagnosis   A  Endometrium (curettage): - Extremely scant benign atrophic endometrium with focal atypia and associated blood     Comment:   - Suggest repeat biopsy due to the scant nature of the specimen and focal atypia  The focal atypia may represent surface metaplasia, however, a malignant process cannot be excluded     - Features of polyp are not identified in this material      Electronically signed by Sherice Solorzano MD on 7/2/2018 at  1:32 PM     Case Report   Surgical Pathology Report                         Case: [id number]                                    Authorizing Provider: Luz Elena Rodrigues MD           Collected:           06/13/2018 0929               Ordering Location:     [facility]   Received:            06/13/2018 1701                                      [place]                                                                        Pathologist:           Elliott Rose MD                                                                Specimens:   A) - Stomach, 1   cold bx  antum, ulcerative area                                                    B) - Esophagus, 2  cold bx  lower esophagus                                                          C) - Polyp, Colorectal, 3  hot snare hepatic flexure polyp                                           D) - Polyp, Colorectal, 4  ascending colonpolyp x 2 cold bx                                          E) - Polyp, Colorectal, 5  cold bx  sigmoid colon polyp                                              F) - Polyp, Colorectal, 6  cold bx   rectal polyp                                          Final Diagnosis   A  Stomach (biopsy):  - Predominately superficial foveolar mucosa with single atypical detached epithelial fragment   - Associated acute inflammatory exudate and rare intestinal mucosa fragment  - No H pylori identified on immunohistochemistry stain  - No fungal organisms identified (Alcian blue/PAS)     Comment:   - The focus of atypia is lost on deeper sections  The differential is reactive atypia versus a dysplastic process  Strongly suggest repeat biopsy if clinically indicated       B  Esophagus (biopsy):  - Esophageal mucosa with no diagnostic abnormality  - No intestinal metaplasia      C  Hepatic flexure polyp (polypectomy):  - Tubulovillous adenoma  - No high grade dysplasia     D  Ascending colon polyp x2 (biopsy):  - Tubular adenoma x2  - No high grade dysplasia      E  Sigmoid colon polyp (biopsy):  - Fragments of hyperplastic polyp  - Negative for dysplasia       F   Rectal polyp (biopsy):  - Hyperplastic polyp  - Negative for dysplasia      Interpretation performed at [facility], [address] [place] [address]         Electronically signed by Elliott Rose MD on 6/15/2018 at  9:20 AM

## 2018-09-26 NOTE — PERIOPERATIVE NURSING NOTE
Returned from [facility] post port placement and paracentesis-dressings dry and intact-no c/o-lunch/fluids offered-albumin infusing

## 2018-09-26 NOTE — PERIOPERATIVE NURSING NOTE
BA on left abd dry and intact-port a cath dsgs secure-no drg-port site with bruising-reviewed discharge instructions

## 2018-10-03 NOTE — NURSING NOTE
Patient said she received a "box of medications at home", she later gave me the name and number for Jez the nurse from [facility]  I left a message for Jez to call Infusion to confirm CADD connect today  I explained chemo treatment today and home connection of chemo pump that will be on for 46 hours  She verbalized understanding

## 2018-10-03 NOTE — TELEPHONE ENCOUNTER
Gayle Gavin spoke to [facility] IR Dept on tues and asked them to contact pt regarding rhoda an appt 
Patient will need to be scheduled for paracentesis tomorrow  We can delay treatment if needed  Thanks 
Please see notes from visit to infusion today 
verbal instruction/written material

## 2018-10-03 NOTE — NURSING NOTE
Jez, the nurse from [facility] called and confirmed that patient is scheduled for pump connect today  I informed her that Dr Josefina Cowart or his nurse will be calling to change the orders for dose or length of time of CADD pump today  She verbalized understanding

## 2018-10-03 NOTE — NURSING NOTE
Due to dose reduce of CADD pump, WILLIAMS Marie will call [place] to schedule changing the length of time for CADD pump to be disconnected  I explained this to patient

## 2018-10-03 NOTE — NURSING NOTE
Patient has been to the restroom twice this morning  Had diarrhea accident in [facility] chair  Dr Steve Brito called to check on patient and I notified him of this  He is sending new orders to dose reduce her chemo today  I reviewed her labs with him as well

## 2018-10-03 NOTE — NURSING NOTE
Patient said yesterday she was not feeling well  Stated she had vomiting and diarrhea  She said it has resolved  Today she had a cup of coffee without any vomiting or diarrhea  She said her abdominal swelling has decreased and is unsure if she needs paracentesis  She said she will tell me later when she would like to schedule paracentesis and understands if she has it while CADD pump is connected she will need a peripheral IV started to have the Albumin if required  She verbalized understanding

## 2018-10-03 NOTE — PROGRESS NOTES
Dr Saran Enamorado wants a 75% reduction in dose of CADD pump  Discussed with Fadi Ngo at [facility] and he can re-program pump to end at ~ 34 5 hrs over the phone with RN at time of hook up   New order sent to kayy

## 2018-10-03 NOTE — NURSING NOTE
Patient have frequent liquid loose stools, new order received and patient taking Lomotil as prescribed  She was instructed to call Dr Markie Bhatti regarding diarrhea, she verbalized understanding

## 2018-10-03 NOTE — PLAN OF CARE
INFECTION - ADULT     Absence or prevention of progression during hospitalization Progressing     Absence of fever/infection during neutropenic period Progressing        Knowledge Deficit     Patient/family/caregiver demonstrates understanding of disease process, treatment plan, medications, and discharge instructions Progressing        Potential for Falls     Patient will remain free of falls Progressing

## 2018-10-04 NOTE — H&P
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  PMH:  Adenocarcinoma of GI origin  DM II  HTN     PSH:  Cholecystectomy  Cataract surgery    Vitals:    10/04/18 1133   BP: 152/70   Pulse: 66   Resp: 16   SpO2: 97%     Gen: NAD  Abd: Distended    Lab Results   Component Value Date    WBC 8 77 10/03/2018    HGB 11 3 (L) 10/03/2018    HCT 35 9 10/03/2018    MCV 97 10/03/2018     10/03/2018     Lab Results   Component Value Date    INR 1 01 09/26/2018    PROTIME 10 6 09/26/2018     Lab Results   Component Value Date     10/03/2018    K 4 1 10/03/2018     10/03/2018    CO2 28 10/03/2018    BUN 14 10/03/2018    CREATININE 1 26 10/03/2018    GLUF 143 (H) 06/21/2018    CALCIUM 8 4 10/03/2018    AST 20 10/03/2018    ALT 26 10/03/2018    ALKPHOS 78 10/03/2018    EGFR 43 10/03/2018     A:  68year old female with history of adenocarcinoma of GI origin returns with recurrent ascites      P:  - Paracentesis

## 2018-10-04 NOTE — SEDATION DOCUMENTATION
Procedure ended, pt otlerated without incident  Vss, 4350 ml yellow fluid removed  Pt to infusion center for albumin replacement

## 2018-10-04 NOTE — BRIEF OP NOTE (RAD/CATH)
IR PARACENTESIS  Procedure Note    PATIENT NAME: Melody Kamara  : 1945  MRN: [id number]     Pre-op Diagnosis:   1  Malignant ascites      Post-op Diagnosis:   1   Malignant ascites        Surgeon:   Masha Arthur MD    Estimated Blood Loss: Minimal    Findings: Successful paracentesis with 4 35 L suzanna colored ascites fluid removed    Specimens: None    Complications:  None    Anesthesia: Local    Masha Arthur MD     Date: 10/4/2018  Time: 12:04 PM

## 2018-10-08 NOTE — TELEPHONE ENCOUNTER
On Call Provider Note  Earlene Williamyao  [id number]  1945    Chief complaint: Nausea and vomiting  HPI:  Received a call regarding the above  This is a 69 yo who  has a past medical history of Abdomen enlarged; Arthritis; Cancer ([address] ); Cellulitis; Diabetes mellitus ([address] ); Diarrhea; Fever; History of colonoscopy; History of esophagogastroduodenoscopy (EGD); Hypertension; Kidney disorder; Mass; Obesity; Peripheral neuropathy; Stomach ache; UTI (urinary tract infection); and Venous insufficiency (chronic) (peripheral)  She was disconnect from Chemo (5fu infusion) on Friday 10/5/18 after starting this cycle on 10/3/18  She notes starting to feel nauseated today, trouble keeping food down starting today  She took one zofran and vomited shortly after  Her , Elda Vargas, states she has abdominal pain,  Upper abdomen, associated with vomiting  Not constant  Past Medical History:   Diagnosis Date    Abdomen enlarged     had paracentesis-8/28/18-malignant ascites    Arthritis     toes    Cancer [address] [address] [address]     unknown site at time of interview    Cellulitis     Diabetes mellitus ([facility] [address] )     type 2    Diarrhea     Fever     low grade for over a month     History of colonoscopy     History of esophagogastroduodenoscopy (EGD)     Hypertension     Kidney disorder     CT scan showed stones-6/2018    Mass     ovary or uterus    Obesity     Peripheral neuropathy     Stomach ache     intermittent- waves of pain    UTI (urinary tract infection)     Dx on 06/11/18 per the pt post urologist call    Venous insufficiency (chronic) (peripheral)       No history exists  Assessment/plan    1  Nausea and vomiting associated with chemotherapy  Patient was encouraged to take Zofran around the clock and to start by taking a dose at the time of our conversation  BRAT diet described and pt was asked to start with dry toast, small amount of food and room temperature water in small volumes       IF abdominal pain worsens, she is to go to ED  [place] office will be notified to call patient on Monday 10/8/18  No orders of the defined types were placed in this encounter  Patient was asked to call the primary hematology/oncology office, on Monday to follow up with nursing staff  Patient voiced understanding and agreement to the above

## 2018-10-08 NOTE — PROGRESS NOTES
Follow up call made to patient  She is able to drink ensure in small doses, taking zofran q8 hours, just started to take imodium today for diarrhea  She will call me tomorrow if s/s worsen

## 2018-10-10 PROBLEM — E87.6 HYPOKALEMIA: Status: ACTIVE | Noted: 2018-01-01

## 2018-10-10 PROBLEM — Z51.5 COMFORT MEASURES ONLY STATUS: Status: ACTIVE | Noted: 2018-01-01

## 2018-10-10 PROBLEM — C16.9 GASTRIC ADENOCARCINOMA (HCC): Status: ACTIVE | Noted: 2018-01-01

## 2018-10-10 PROBLEM — K55.9 ISCHEMIC BOWEL DISEASE (HCC): Status: ACTIVE | Noted: 2018-01-01

## 2018-10-10 NOTE — PLAN OF CARE
Problem: DISCHARGE PLANNING - CARE MANAGEMENT  Goal: Discharge to post-acute care or home with appropriate resources  INTERVENTIONS:  - Conduct assessment to determine patient/family and health care team treatment goals, and need for post-acute services based on payer coverage, community resources, and patient preferences, and barriers to discharge  - Address psychosocial, clinical, and financial barriers to discharge as identified in assessment in conjunction with the patient/family and health care team  - Arrange appropriate level of post-acute services according to patient's   needs and preference and payer coverage in collaboration with the physician and health care team  - Communicate with and update the patient/family, physician, and health care team regarding progress on the discharge plan  - Arrange appropriate transportation to [address] vs Home with Hospice services  Outcome: Progressing

## 2018-10-10 NOTE — SOCIAL WORK
CM called to speak with the family re: hospice services  CM went down to ER and spoke with the pt and her   They have discussed it and would like Hospice eval to see if that is something that is right for them at this time  Pt is very concerned about her children and them being taken care and noted that she is in pain and will need that care as well  Pt being admitted by ER to the hospitalist service and requested that Hospice be called to assist with her care at this point  We discussed JEROME, agreeable with this referral and referral called and sent through [facility]  JEROME here now to speak with the pt and her

## 2018-10-10 NOTE — ED NOTES
Patient had returned from CT with chills  Incontinent of large amount liquid pink stool, culture sent physician aware  María rectal cleansing, patient has excoriation and barrier cream applied  IV is patent but positional , patient will keep arm straight  Dr Mardelle Nageotte visited and discussed palliative care with patient and   Wilder walden at bedside        Ross Win RN  10/10/18 [id number]

## 2018-10-10 NOTE — ED NOTES
Dr Mary Chavarria aware of Lactic Acid 2 6 - patient only meets 1 SIRS criteria at this time so sepsis alert not called per Dr Emilia Ave Deretha Lundborg, WILLIAMS  10/10/18 8965

## 2018-10-10 NOTE — ED NOTES
Dr Kaleigh Faith is aware of K level, no orders at this time        Juan Francisco Tompkins RN  10/10/18 0236

## 2018-10-10 NOTE — CONSULTS
Consultation - General Surgery   Yordy Martínez 68 y o  female MRN: [id number]  Unit/Bed#: ED 03 Encounter: [id number]: Frances Gonzalez MD      Physician Requesting Consult: Vielka Aburto MD  Reason for Consult / Principal Problem:   Lactic acidosis  With mesenteric and portal venous air  Possible infarction of the stomach and multiple areas of small bowel  Nonoperable stomach cancer and massive ascites    Chief Complaint:   Abdominal pain  HPI:  Yordy Martínez is a 68 y o  female who presents with Abdominal pain and nausea vomiting  Patient was recently diagnosed the stomach cancer and had endoscopy done by Dr Collier  2 weeks ago which was positive for well-differentiated adenocarcinoma of the stomach   Patient has extensive carcinomatosis and had omental biopsy done by GYN oncology which revealed a the metastasis secondary to stomach carcinoma   Patient has been following Dr Shahram Phillips for possible chemotherapy and a Port-A-Cath was placed by interventional radiology    Presents here today with abdominal pain nausea vomiting of for 2 days duration which got worse and the  Workup in the ER revealed extensive for CT scan findings for infarcted stomach and proximal small bowel  Surgical consult was obtained for possible emergent surgery    Historical Information   Past Medical History:   Diagnosis Date    Abdomen enlarged     had paracentesis-8/28/18-malignant ascites    Arthritis     toes    Cancer ([address] )     unknown site at time of interview    Cellulitis     Diabetes mellitus ([facility] [address] [facility] )     type 2    Diarrhea     Fever     low grade for over a month     History of colonoscopy     History of esophagogastroduodenoscopy (EGD)     Hypertension     Kidney disorder     CT scan showed stones-6/2018    Mass     ovary or uterus    Obesity     Peripheral neuropathy     Stomach ache     intermittent- waves of pain    UTI (urinary tract infection)     Dx on 06/11/18 per the pt post urologist call    Venous insufficiency (chronic) (peripheral)      Past Surgical History:   Procedure Laterality Date    CATARACT EXTRACTION Bilateral     CHOLECYSTECTOMY  1995    lap    COLONOSCOPY N/A 6/13/2018    Procedure: COLONOSCOPY;  Surgeon: Evin Caal MD;  Location: [facility]; Service: Gastroenterology    IR PARACENTESIS  8/28/2018    IR PARACENTESIS  9/10/2018    IR PARACENTESIS  9/21/2018    IR PARACENTESIS  9/26/2018    IR PARACENTESIS  10/4/2018    IR PORT PLACEMENT  9/26/2018    KNEE ARTHROSCOPY Left     x5    KNEE ARTHROSCOPY Right     x2    UT EGD TRANSORAL BIOPSY SINGLE/MULTIPLE N/A 6/13/2018    Procedure: ESOPHAGOGASTRODUODENOSCOPY (EGD); Surgeon: Evin Caal MD;  Location: [facility] GI LAB; Service: Gastroenterology    UT EGD TRANSORAL BIOPSY SINGLE/MULTIPLE N/A 9/5/2018    Procedure: ESOPHAGOGASTRODUODENOSCOPY (EGD); Surgeon: Evin Caal MD;  Location: [facility] GI LAB; Service: Gastroenterology    UT LAP,DIAGNOSTIC ABDOMEN N/A 7/26/2018    Procedure: diagnostic laparoscopy, peritoneal biopsy ;  Surgeon: Umer Schuler MD;  Location:  MAIN OR;  Service: Gynecology Oncology     Social History   History   Alcohol Use    1 2 oz/week    2 Glasses of wine per week     Comment: daily     History   Drug Use No     History   Smoking Status    Former Smoker    Packs/day: 2 00    Years: 50 00    Types: Cigarettes    Quit date: 06/2018   Smokeless Tobacco    Never Used     Family History:   Family History   Problem Relation Age of Onset    Cancer Mother         breast age 48    Early death Mother    Ricardo Solis Alcohol abuse Father     Heart disease Father         MI    Cancer Daughter         skin    Hypertension Son     No Known Problems Son        Meds/Allergies   No current facility-administered medications for this encounter       Current Outpatient Prescriptions:     cetirizine (ZyrTEC) 10 mg tablet, Take 10 mg by mouth daily at bedtime, Disp: , Rfl:     furosemide (LASIX) 80 mg tablet, Take 80 mg by mouth every morning  , Disp: , Rfl:     potassium chloride (K-DUR,KLOR-CON) 20 mEq tablet, TK 1 T PO  D, Disp: , Rfl: 3    amLODIPine (NORVASC) 5 mg tablet, Take 5 mg by mouth every morning, Disp: , Rfl:     esomeprazole (NexIUM) 40 MG capsule, 40 mg every morning  , Disp: , Rfl: 0    losartan (COZAAR) 100 MG tablet, Take 100 mg by mouth every morning, Disp: , Rfl:     metFORMIN (GLUCOPHAGE) 500 mg tablet, Take 500 mg by mouth 2 (two) times a day with meals, Disp: , Rfl:     ondansetron (ZOFRAN) 8 mg tablet, Take 1 tablet (8 mg total) by mouth every 8 (eight) hours as needed for nausea or vomiting, Disp: 20 tablet, Rfl: 0    oxyCODONE-acetaminophen (PERCOCET) 5-325 mg per tablet, Take 1 tablet by mouth every 6 (six) hours as needed for moderate pain Max Daily Amount: 4 tablets, Disp: 40 tablet, Rfl: 0    pregabalin (LYRICA) 200 MG capsule, Take 200 mg by mouth 2 (two) times a day, Disp: , Rfl:    Allergies   Allergen Reactions    Sulfa Antibiotics Hives       REVIEW OF SYSTEMS  Constitutional:  Denies fever or chills   Eyes:  Denies change in visual acuity   HENT:  Denies nasal congestion or sore throat   Respiratory:  Some shortness of breath Secondary to abdominal distention  Cardiovascular:  Denies chest pain or edema   GI: Complaining ofabdominal pain, nausea, vomiting, And diarrhea Massive abdominal distention  :  Denies dysuria, frequency, difficulty in micturition and nocturia  Musculoskeletal:  Denies back pain or joint pain    Neurologic:  Denies headache, focal weakness or sensory changes   Endocrine:  Denies polyuria or polydipsia   Lymphatic:  Denies swollen glands   Psychiatric:  Denies depression or anxiety     Objective   PHYSICAL EXAMS  Current Vitals:   Blood Pressure: 135/99 (10/10/18 1645)  Pulse: (!) 130 (10/10/18 1700)  Temperature: 97 6 °F (36 4 °C) (10/10/18 1549)  Temp Source: Oral (10/10/18 1549)  Respirations: (!) 25 (10/10/18 1700)  Weight - Scale: 113 kg (250 lb) (10/10/18 1410)  SpO2: 91 % (10/10/18 1700) Body mass index is 39 15 kg/m²  I/Os:No intake/output data recorded  No intake/output data recorded  General:  Patient is In acute distress, laying in the bed comfortably,   awake, alert responding to commands, well oriented to time place and person Appears very sick looking  At times becomes drowsy  HEENT:  Both pupils normal-size atraumatic, normocephalic, nonicteric  Neck:  JVP not raised   Trachea central  Respiratory: Bilateral decreased air entry both bases  Cardiovascular:  S1-S2 normal without any murmur   GI:  Abdomen soft Massively distended secondary Large amount of fluid under the epigastric and supraumbilical area  Rectal: deferred  Genitalia: deferred  Musculoskeletal:  Active pain  Integument:  No skin rashes or ulceration  Neurologic:  Patient is awake alert, responding to command, well-oriented to time and place and person moving all extremities   Psychiatric:  Patient awake alert doesn't appear depressed affect normal  Extremities: Edema of the both lower extremity    Lab Results and Cultures:   CBC with diff:   Lab Results   Component Value Date    WBC 4 98 10/10/2018    HGB 12 8 10/10/2018    HCT 40 8 10/10/2018    MCV 97 10/10/2018     10/10/2018    MCH 30 3 10/10/2018    MCHC 31 4 10/10/2018    RDW 14 1 10/10/2018    MPV 9 3 10/10/2018    NRBC 0 10/10/2018   ,   BMP/CMP:  Lab Results   Component Value Date     10/10/2018    K 2 9 (L) 10/10/2018     10/10/2018    CL 95 (L) 09/13/2018    CO2 31 10/10/2018    CO2 26 09/13/2018    BUN 21 10/10/2018    BUN 11 09/13/2018    CREATININE 1 23 10/10/2018    CALCIUM 9 0 10/10/2018    AST 24 10/10/2018    ALT 34 10/10/2018    ALKPHOS 122 (H) 10/10/2018    EGFR 44 10/10/2018     Coags:   Lab Results   Component Value Date    PTT 24 10/10/2018    INR 1 01 10/10/2018     Urinalysis:   Lab Results   Component Value Date    COLORU Yellow 09/13/2018    SPECGRAV 1 007 09/13/2018   ,   Urine Culture:   Lab Results   Component Value Date    URINECX 60,000-69,000 cfu/ml Escherichia coli (A) 07/26/2018    URINECX 60,000-69,000 cfu/ml Escherichia coli (A) 07/26/2018   ,   Imaging: Ct Chest Abdomen Pelvis Wo Contrast    Result Date: 10/10/2018  Impression: 1  Massive abdominal and pelvic ascites  2   Enlarged uterus  Mass along the left side of the uterus  Carcinomatosis  3   Extensive portal venous air  Air is present in multiple mesenteric veins in the abdomen  Cannot exclude small amounts of free air versus air in small veins  4   Small bowel obstruction  5   Extensive air within the walls of the stomach and multiple small bowel loops compatible with ischemic bowel and necrosis involving the stomach and multiple small bowel loops  * I personally telephoned this result to Ruth Joyner on 10/10/2018 3:45 PM  Workstation performed: [id number]     Admitting Diagnosis: Abdominal pain [R10 9]  Code Status: No Order  Length Of Stay: 0 day(s)    Assessment:  Advance Non-operable stomach cancer  -  Moderately differentiated adenocarcinoma  with the metastasis with peritoneal carcinomatosis and massive ascites  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  Lactic acidosis  Extensive Portal venous Air and mesenteric venous Air  Small bowel obstruction  Abdominal pain  Hypokalemia    Plan:  CT scan findings were discussed with Dr Neil PILLAI  Radiologist and the the CT scans findings were explained to the patient and her  in detail  In view of off non-operable stomach cancer Massive ascites carcinomatosis of omentum  An large pelvic masses with possible gangrene of the stomach and small bowel An large stomach tumor with the portal venous air and mesenteric venous air compatible with gangrene of the stomach and small bowel     Patient was explained for possible laparotomy which may be possibly open and close considering the extent of for bowel ischemia and the stomach ischemia  An advanced cancer  Surgical intervention will not be Able to cure patient's illness  The patient and her  decided for comfort and hospice care  Dr Og Fan was informed for possible admission for hospice care  Care was discussed with Dr Leah Matias MD gastrologist   We will consult Dr Gong Herb / and hospice for end of life care    Counseling / Coordination of Care  Total floor / unit time spent today 30minutes  Greater than 50% of total time was spent with the patient and / or family counseling and / or coordination of care  Thank you for allowing me to participate in this patients care  Please do not hesitate to call with any additional questions  Gilmer Bauer MD MS FRCS FACS  10/10/18  5:13 PM    Portions of the record may have been created with voice recognition software  Occasional wrong word or "sound a like" substitutions may have occurred due to the inherent limitations of voice recognition software  Read the chart carefully and recognize, using context, where substitutions have occurred

## 2018-10-10 NOTE — H&P
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[facility] )    Gastric adenocarcinoma ([facility] )    Malignant ascites    Hypokalemia      Assessment/Plan:    * Comfort measures only status   Assessment & Plan    Admit patient under comfort measures  CT scan done in the ER showed carcinomatosis, massive ascites, extensive portal venous air and air in multiple mesenteric veins in abdomen, small bowel obstruction and findings suggesting ischemic bowel and necrosis involving stomach and multiple small bowel loops  Patient was seen by surgery in the ER  After discussion with surgery, patient and  opted for comfort measures only  They understand that without any treatment, patient will   Patient started on IV Dilaudid scheduled and as needed for pain management as patient reports severe pain even after receiving morphine  IV Ativan, Zofran as needed  Hospice consult     Ischemic bowel disease [facility])   Assessment & Plan    Patient seen by surgery in the ER  Management as noted above     Gastric adenocarcinoma [facility])   Assessment & Plan    With metastasis  Management as noted above     Hypokalemia   Assessment & Plan    Patient is admitted under comfort measures  Management as noted above     Malignant ascites   Assessment & Plan    Management as noted above    Has had paracentesis previously             VTE Prophylaxis: Pharmacologic VTE Prophylaxis contraindicated due to Comfort measures only  / reason for no mechanical VTE prophylaxis Comfort measures only   Code Status: Level 4 - Comfort Care    Anticipated Length of Stay:  Patient will be admitted on an Inpatient basis with an anticipated length of stay of atleast 2 midnights  Justification for Hospital Stay:  Comfort care patient, ischemic bowel    Total Time for Visit, including Counseling / Coordination of Care: 45 minutes  Greater than 50% of this total time spent on direct patient counseling and coordination of care  Chief Complaint:     Abdominal Pain (Arrives BLS with epigastric pain , nausea, vomiting and diarrhea for several days  )    of Present Illness:    Tiburcio Cha is a 68 y o  female who presents with abdominal pain, distension  Patient with known history of gastric adenocarcinoma with metastasis and frequent malignant ascites requiring paracentesis in the past   Patient states that since Sunday she has been having abdominal pain, distension, nausea, vomiting and diarrhea  Her symptoms have gradually worsened  She reported that the pain has never been this severe in the past   CT scan done in the ER showed massive ascites, extensive portal venous air, small bowel obstruction and findings suggestive of ischemic bowel and necrosis  Patient was seen by surgery in the ER and after discussion patient and family opted for comfort measures    Review of Systems:    Review of Systems   Constitutional: Positive for appetite change  Negative for chills and fever  HENT: Positive for trouble swallowing  Negative for sore throat  Eyes: Negative for photophobia and visual disturbance  Respiratory: Positive for shortness of breath  Cardiovascular: Positive for chest pain and leg swelling (chronic)  Gastrointestinal: Positive for abdominal distention, abdominal pain, diarrhea, nausea and vomiting  Genitourinary: Negative for dysuria, frequency and hematuria  Musculoskeletal: Positive for back pain  Skin: Negative for wound  Neurological: Positive for light-headedness  Negative for syncope and speech difficulty  Psychiatric/Behavioral: Negative for agitation         Past Medical and Surgical History: Past Medical History:   Diagnosis Date    Abdomen enlarged     had paracentesis-8/28/18-malignant ascites    Arthritis     toes    Cancer ([facility] [facility] )     unknown site at time of interview    Cellulitis     Diabetes mellitus ([facility] )     type 2    Diarrhea     Fever     low grade for over a month     History of colonoscopy     History of esophagogastroduodenoscopy (EGD)     Hypertension     Kidney disorder     CT scan showed stones-6/2018    Mass     ovary or uterus    Obesity     Peripheral neuropathy     Stomach ache     intermittent- waves of pain    UTI (urinary tract infection)     Dx on 06/11/18 per the pt post urologist call    Venous insufficiency (chronic) (peripheral)        Past Surgical History:   Procedure Laterality Date    CATARACT EXTRACTION Bilateral     CHOLECYSTECTOMY  1995    lap    COLONOSCOPY N/A 6/13/2018    Procedure: COLONOSCOPY;  Surgeon: Marcie Curry MD;  Location: [facility]; Service: Gastroenterology    IR PARACENTESIS  8/28/2018    IR PARACENTESIS  9/10/2018    IR PARACENTESIS  9/21/2018    IR PARACENTESIS  9/26/2018    IR PARACENTESIS  10/4/2018    IR PORT PLACEMENT  9/26/2018    KNEE ARTHROSCOPY Left     x5    KNEE ARTHROSCOPY Right     x2    SC EGD TRANSORAL BIOPSY SINGLE/MULTIPLE N/A 6/13/2018    Procedure: ESOPHAGOGASTRODUODENOSCOPY (EGD); Surgeon: Marcie Curry MD;  Location: [facility] GI LAB; Service: Gastroenterology    SC EGD TRANSORAL BIOPSY SINGLE/MULTIPLE N/A 9/5/2018    Procedure: ESOPHAGOGASTRODUODENOSCOPY (EGD); Surgeon: Marcie Curry MD;  Location: [facility] GI LAB; Service: Gastroenterology    SC LAP,DIAGNOSTIC ABDOMEN N/A 7/26/2018    Procedure: diagnostic laparoscopy, peritoneal biopsy ;  Surgeon: All Helm MD;  Location: [facility];  Service: Gynecology Oncology       Meds/Allergies:    PTA meds:   Prior to Admission Medications   Prescriptions Last Dose Informant Patient Reported? Taking?    amLODIPine (NORVASC) 5 mg tablet  Self Yes No   Sig: Take 5 mg by mouth every morning   cetirizine (ZyrTEC) 10 mg tablet Past Month at Unknown time Self Yes Yes   Sig: Take 10 mg by mouth daily at bedtime   esomeprazole (NexIUM) 40 MG capsule 10/10/2018 Self Yes No   Si mg every morning     furosemide (LASIX) 80 mg tablet Past Week at Unknown time Self Yes Yes   Sig: Take 80 mg by mouth every morning     losartan (COZAAR) 100 MG tablet  Self Yes No   Sig: Take 100 mg by mouth every morning   metFORMIN (GLUCOPHAGE) 500 mg tablet  Self Yes No   Sig: Take 500 mg by mouth 2 (two) times a day with meals   ondansetron (ZOFRAN) 8 mg tablet   No No   Sig: Take 1 tablet (8 mg total) by mouth every 8 (eight) hours as needed for nausea or vomiting   oxyCODONE-acetaminophen (PERCOCET) 5-325 mg per tablet 10/10/2018  No No   Sig: Take 1 tablet by mouth every 6 (six) hours as needed for moderate pain Max Daily Amount: 4 tablets   potassium chloride (K-DUR,KLOR-CON) 20 mEq tablet Past Week at Unknown time Self Yes Yes   Sig: TK 1 T PO  D   pregabalin (LYRICA) 200 MG capsule  Self Yes No   Sig: Take 200 mg by mouth 2 (two) times a day      Facility-Administered Medications: None       Allergies:    Allergies   Allergen Reactions    Sulfa Antibiotics Hives     History:     Marital Status: /Civil Union     Substance Use History:   History   Alcohol Use    1 2 oz/week    2 Glasses of wine per week     Comment: daily     History   Smoking Status    Former Smoker    Packs/day: 2 00    Years: 50 00    Types: Cigarettes    Quit date: 2018   Smokeless Tobacco    Never Used     History   Drug Use No       Family History:    Family History   Problem Relation Age of Onset    Cancer Mother         breast age 48    Early death Mother     Alcohol abuse Father     Heart disease Father         MI    Cancer Daughter         skin    Hypertension Son     No Known Problems Son        Physical Exam:     Vitals:   Blood Pressure: 141/73 (10/10/18 1715)  Pulse: (!) 130 (10/10/18 1730)  Temperature: 97 6 °F (36 4 °C) (10/10/18 1549)  Temp Source: Oral (10/10/18 1549)  Respirations: (!) 24 (10/10/18 1730)  Weight - Scale: 113 kg (250 lb) (10/10/18 1410)  SpO2: 92 % (10/10/18 1730)    Physical Exam   Constitutional: She is oriented to person, place, and time  In distress due to pain   HENT:   Head: Normocephalic and atraumatic  Eyes: EOM are normal  Right eye exhibits no discharge  Left eye exhibits no discharge  No scleral icterus  Neck: Neck supple  Cardiovascular: Regular rhythm  tachycardic   Pulmonary/Chest: Effort normal  She has no wheezes  She has no rales  tachypneic   Abdominal: She exhibits distension  There is tenderness (generalized but mostly in epigastric region)  Musculoskeletal: She exhibits edema (B/L L  E - chronic as per patient)  Neurological: She is alert and oriented to person, place, and time  No cranial nerve deficit  Skin: She is not diaphoretic  Lab Results: I have personally reviewed pertinent reports  Results from last 7 days  Lab Units 10/10/18  1415   WBC Thousand/uL 4 98   HEMOGLOBIN g/dL 12 8   HEMATOCRIT % 40 8   PLATELETS Thousands/uL 275   NEUTROS PCT % 58   LYMPHS PCT % 32   MONOS PCT % 9   EOS PCT % 1       Results from last 7 days  Lab Units 10/10/18  1415   SODIUM mmol/L 143   POTASSIUM mmol/L 2 9*   CHLORIDE mmol/L 102   CO2 mmol/L 31   BUN mg/dL 21   CREATININE mg/dL 1 23   CALCIUM mg/dL 9 0   ALK PHOS U/L 122*   ALT U/L 34   AST U/L 24       Results from last 7 days  Lab Units 10/10/18  1415   INR  1 01       Imaging: I have personally reviewed pertinent reports  Ct Chest Abdomen Pelvis Wo Contrast    Result Date: 10/10/2018  Narrative: CT CHEST, ABDOMEN AND PELVIS WITHOUT IV CONTRAST INDICATION:   Abdominal pain   COMPARISON:  06/04/2018 TECHNIQUE: CT examination of the chest, abdomen and pelvis was performed without intravenous contrast   Axial, sagittal, and coronal 2D reformatted images were created from the source data and submitted for interpretation  Radiation dose length product (DLP) for this visit:  1108 09 mGy-cm   This examination, like all CT scans performed in the [facility], was performed utilizing techniques to minimize radiation dose exposure, including the use of iterative reconstruction and automated exposure control  Enteric contrast was not administered  FINDINGS: CHEST LUNGS:  Minimal atelectasis is present at the left lung base  PLEURA:  Small left pleural effusion  HEART/GREAT VESSELS:  Cardiomegaly  Coronary artery calcifications  MEDIASTINUM AND PRIYANKA:  Hiatal hernia  Fluid in the esophagus which is distended  CHEST WALL AND LOWER NECK:   Port-A-Cath on the right  ABDOMEN LIVER/BILIARY TREE:  Extensive portal venous air  GALLBLADDER:  Cholecystectomy  SPLEEN:  Unremarkable  PANCREAS:  Unremarkable  ADRENAL GLANDS:  Right adrenal nodule  KIDNEYS/URETERS:  Bilateral renal atrophy  Bilateral renal calculi  Left hydronephrosis unchanged  STOMACH AND BOWEL:  Air within the walls of the stomach  The stomach is filled with fluid  There are multiple dilated proximal small bowel loops present  Air within the bowel wall  No large bowel distention  APPENDIX:  No findings to suggest appendicitis  ABDOMINOPELVIC CAVITY:  Large amount of abdominal and pelvic ascites  Extensive infiltration within the mesentery compatible with carcinomatosis  VESSELS:  Air within multiple mesenteric veins  Air within the portal vein  PELVIS REPRODUCTIVE ORGANS:  Enlarged uterus with a mass adjacent to the left side of the uterus  Large calcification on the right side of the uterus compatible with a fibroid  URINARY BLADDER:  Unremarkable  ABDOMINAL WALL/INGUINAL REGIONS:  Unremarkable  OSSEOUS STRUCTURES:  Degenerative changes in the thoracic and lumbar spine particularly at the L4/L5 level  Impression: 1  Massive abdominal and pelvic ascites  2   Enlarged uterus    Mass along the left side of the uterus  Carcinomatosis  3   Extensive portal venous air  Air is present in multiple mesenteric veins in the abdomen  Cannot exclude small amounts of free air versus air in small veins  4   Small bowel obstruction  5   Extensive air within the walls of the stomach and multiple small bowel loops compatible with ischemic bowel and necrosis involving the stomach and multiple small bowel loops  * I personally telephoned this result to Brennan Fritz on 10/10/2018 3:45 PM  Workstation performed: [id number]     Ir Paracentesis    Result Date: 10/4/2018  Narrative: EXAMINATION: Ultrasound-guided paracentesis INDICATION: 80-year-old female with history of adenocarcinoma of GI origin returns with recurrent ascites  IMAGES:  1 ANESTHESIA: Local lidocaine PROCEDURE:  The patient was identified verbally and by wristband  Timeout was performed  Informed consent was obtained  Following obtaining informed consent, the patient was prepped and draped in the usual sterile fashion  1% local lidocaine was infiltrated in the skin and subcutaneous tissues of the left lower quadrant abdomen  Under ultrasound guidance, a 5-Latvian Yueh centesis needle catheter was advanced into the left lower quadrant peritoneal space  Images were saved  The needle portion was removed, leaving the catheter in place  Catheter was connected to a Vacutainer and 4 35 L of suzanna-colored fluid was removed  Catheter was removed and bandage applied  The patient tolerated the procedure well without complication  The patient left the IR department in stable condition  Findings: 1  Preprocedure ultrasound showed large ascites  2   Successful removal of 4 35 L of suzanna-colored ascites fluid  Impression: Impression: Successful paracentesis with 4 35 L of suzanna-colored ascites fluid removed   Workstation performed: [id number]     Ir Paracentesis    Result Date: 9/26/2018  Narrative: Ultrasound-guided paracentesis Clinical History: Gastric adenocarcinoma, recurrent ascites  Plan for port placement and paracentesis  Decision made for paracentesis 1st as the patient has difficulty lying supine due to dyspnea  Procedure: After explaining the risks and benefits of the procedure to the patient, informed consent was obtained  Ultrasound used to localize the left lower quadrant ascites  The overlying skin was prepped and draped in usual sterile fashion and local anesthesia was obtained with the 1% lidocaine solution  A 5 Western Shi Yueh needle was advanced under live ultrasound guidance until fluid was aspirated  Approximately 3000 cc' s of clear, yellow fluid was aspirated  The patient tolerated the procedure well, she was then brought to the fluoroscopy suite for port placement  An albumin infusion was begun during port placement  Impression: Impression: Ultrasound-guided paracentesis  Workstation performed: [id number]     Ir Paracentesis    Result Date: 9/21/2018  Narrative: EXAMINATION: Ultrasound-guided paracentesis INDICATION: 80-year-old female with history of adenocarcinoma of GI origin presents with recurrent ascites  IMAGES:  2 ANESTHESIA: Local lidocaine PROCEDURE:  The patient was identified verbally and by wristband  Timeout was performed  Informed consent was obtained  Following obtaining informed consent, the patient was prepped and draped in the usual sterile fashion  1% local lidocaine was infiltrated in the skin and subcutaneous tissues of the left lower quadrant abdomen  Under ultrasound guidance, a 5-Slovenian Yueh centesis needle catheter was advanced into the left lower quadrant peritoneal space  Images were saved  The needle component was removed leaving the catheter in place  Catheter was connected to a Vacutainer and a total of 5 5 L of suzanna-colored ascites was removed  Following removal of the ascites fluid, the needle was removed and bandage applied  The patient tolerated the procedure well without complication    The patient left the IR department in stable condition  Findings: 1  Preprocedure ultrasound showed large ascites  2   Successful paracentesis with 5 5 L of suzanna-colored ascites fluid removed  Impression: Impression: Successful paracentesis with 5 5 L of ascites fluid removed  Workstation performed: [id number]     Ir Port Placement    Result Date: 9/26/2018  Narrative: Chest port placement  Clinical History: Gastric adenocarcinoma, port placement for chemotherapy  Fluoro time: 0 8 minutes Number of Images: 5 Conscious sedation time: 5 0 minutes Technique: The patient was brought to the interventional radiology preprocedure area and identified verbally and by wristband  After discussion of the procedure and its details, risks, benefits, and alternatives both verbal and written informed consent was obtained from the patient and placed in the chart  A paracentesis was performed 1st, this is dictated separately  The patient was then brought to the interventional radiology angiography suite and placed supine on the table  The right internal jugular vein was evaluated as a potential access site with ultrasound  The vessel was found to be patent and compressible  An 8-Turkish Midsize Dignity  port was selected for the procedure  The right neck and upper chest were prepped and draped in the usual sterile fashion  All elements of maximal sterile barrier technique were followed (cap, mask, sterile gown, sterile gloves, large sterile sheet, hand hygiene and 2% chlorhexidine for cutaneous antisepsis)  Sterile ultrasound technique with sterile gel and sterile probe covers were also utilized  A preprocedure timeout was performed per standard department protocol  Under ultrasound guidance, lidocaine was administered to the skin and a small skin incision was made  Under ultrasound guidance, the right internal jugular vein was accessed using single wall Seldinger technique   Static images of real time needle entry into the vessel were obtained  A 0 018 wire was then advanced through the needle into the central venous system  The needle was removed, and a 5 Czech coaxial dilator was inserted  Next, attention was turned to the right chest and a site for the port pocket was selected  This area was then anesthetized with lidocaine, as well as the projected tunnel  A subcutaneous pocket was created in the skin of the upper chest for the port reservoir utilizing a surgical incision  The port catheter was then tunneled under the skin of the upper chest  Attention was then turned to the 5-Czech coaxial dilator  The microwire and inner dilator were removed  A heavy wire was inserted through the outer dilator and a 9 Czech peel-away sheath was inserted over the wire  The catheter was advanced through the peel-away and the peel-away was removed  The catheter tip was then positioned in the right atrium under fluoroscopic control  The catheter was connected to the port, and the port inserted into the subcutaneous pocket  The port was then flushed and aspirated successfully and 100 unit heparin/cc solution was administered into the lumen  The pocket was closed with interrupted 3-0 and 4-0 Vicryl suture and Histoacryl  A sterile dressing was applied  The neck incision was closed with 3-0 Vicryl suture  and Histoacryl  Findings: Patent and compressible right internal jugular vein  Successful 8-Czech power injectable port placement with catheter tip at the superior cavoatrial junction  Impression: Impression: Successful ultrasound and fluoroscopically guided placement of an 8-Czech Midsize Dignity power injectable chest port via the right internal jugular vein  The tip of the catheter projects in the right atrium and may be used immediately  Workstation performed: [id number]       CT chest abdomen pelvis wo contrast   Final Result         1  Massive abdominal and pelvic ascites  2   Enlarged uterus    Mass along the left side of the uterus  Carcinomatosis  3   Extensive portal venous air  Air is present in multiple mesenteric veins in the abdomen  Cannot exclude small amounts of free air versus air in small veins  4   Small bowel obstruction  5   Extensive air within the walls of the stomach and multiple small bowel loops compatible with ischemic bowel and necrosis involving the stomach and multiple small bowel loops  * I personally telephoned this result to Srivastava Christa on 10/10/2018 3:45 PM       Workstation performed: [id number]             eTutor/EPIC Records Reviewed: Yes     ** Please Note: "This note has been constructed using a voice recognition system  Therefore there may be syntax, spelling, and/or grammatical errors   Please call if you have any questions  "**

## 2018-10-10 NOTE — ED PROVIDER NOTES
History  Chief Complaint   Patient presents with    Abdominal Pain     Arrives BLS with epigastric pain , nausea, vomiting and diarrhea for several days  Patient is a 60-year-old female with known gastric carcinoma with peritoneal carcinomatosis and frequent malignant ascites  Patient states since last night she has been having worsening severe abdominal pain and abdominal distension  Patient states she has been very nauseous, she has been vomiting some and having multiple episodes of diarrhea  Patient denies any fevers or chills, patient states the pain is never been this bad in the past   Patient denies any aggravating or alleviating factors  Patient denies any chest pain, some mild shortness of breath secondary to the distention of her abdomen  None       Past Medical History:   Diagnosis Date    Abdomen enlarged     had paracentesis-8/28/18-malignant ascites    Arthritis     toes    Cancer ([facility] [address] )     unknown site at time of interview    Cellulitis     Diabetes mellitus ([facility] [address] )     type 2    Diarrhea     Fever     low grade for over a month     History of colonoscopy     History of esophagogastroduodenoscopy (EGD)     Hypertension     Kidney disorder     CT scan showed stones-6/2018    Mass     ovary or uterus    Obesity     Peripheral neuropathy     Stomach ache     intermittent- waves of pain    UTI (urinary tract infection)     Dx on 06/11/18 per the pt post urologist call    Venous insufficiency (chronic) (peripheral)        Past Surgical History:   Procedure Laterality Date    CATARACT EXTRACTION Bilateral     CHOLECYSTECTOMY  1995    lap    COLONOSCOPY N/A 6/13/2018    Procedure: COLONOSCOPY;  Surgeon: Myriam Regalado MD;  Location: [facility] GI LAB;   Service: Gastroenterology    IR PARACENTESIS  8/28/2018    IR PARACENTESIS  9/10/2018    IR PARACENTESIS  9/21/2018    IR PARACENTESIS  9/26/2018    IR PARACENTESIS  10/4/2018    IR PORT PLACEMENT  9/26/2018    KNEE ARTHROSCOPY Left     x5    KNEE ARTHROSCOPY Right     x2    OK EGD TRANSORAL BIOPSY SINGLE/MULTIPLE N/A 6/13/2018    Procedure: ESOPHAGOGASTRODUODENOSCOPY (EGD); Surgeon: Humaira Bo MD;  Location: [facility] GI LAB; Service: Gastroenterology    OK EGD TRANSORAL BIOPSY SINGLE/MULTIPLE N/A 9/5/2018    Procedure: ESOPHAGOGASTRODUODENOSCOPY (EGD); Surgeon: Humaira Bo MD;  Location: [facility] GI LAB; Service: Gastroenterology    OK LAP,DIAGNOSTIC ABDOMEN N/A 7/26/2018    Procedure: diagnostic laparoscopy, peritoneal biopsy ;  Surgeon: Cornelius Sena MD;  Location:  MAIN OR;  Service: Gynecology Oncology       Family History   Problem Relation Age of Onset    Cancer Mother         breast age 48    Early death Mother    [facility] Alcohol abuse Father     Heart disease Father         MI    Cancer Daughter         skin    Hypertension Son     No Known Problems Son      I have reviewed and agree with the history as documented  Social History   Substance Use Topics    Smoking status: Former Smoker     Packs/day: 2 00     Years: 50 00     Types: Cigarettes     Quit date: 06/2018    Smokeless tobacco: Never Used    Alcohol use 1 2 oz/week     2 Glasses of wine per week      Comment: daily        Review of Systems   Constitutional: Positive for diaphoresis  Negative for chills and fever  HENT: Negative for facial swelling and trouble swallowing  Respiratory: Positive for shortness of breath  Negative for chest tightness and wheezing  Cardiovascular: Positive for leg swelling  Negative for chest pain and palpitations  Gastrointestinal: Positive for abdominal distention, diarrhea, nausea and vomiting  Genitourinary: Negative for dysuria and frequency  Musculoskeletal: Positive for back pain  Negative for neck pain and neck stiffness  Skin: Negative  Neurological: Negative for weakness and numbness  Hematological: Negative  Psychiatric/Behavioral: Negative          Physical Exam  Physical Exam Constitutional: She appears well-developed and well-nourished  She appears distressed  HENT:   Head: Normocephalic and atraumatic  Neck: Normal range of motion  Cardiovascular: Regular rhythm  Bradycardia present  Pulmonary/Chest: Tachypnea noted  She has decreased breath sounds in the right lower field and the left lower field  Abdominal: Soft  She exhibits distension and ascites  Bowel sounds are absent  There is generalized tenderness  There is no rigidity, no rebound and no guarding  Musculoskeletal: Normal range of motion  She exhibits edema  Neurological: She is alert  Skin: Skin is warm  She is diaphoretic  Nursing note and vitals reviewed        Vital Signs  ED Triage Vitals   Temperature Pulse Respirations Blood Pressure SpO2   10/10/18 1410 10/10/18 1410 10/10/18 1410 10/10/18 1410 10/10/18 1410   (!) 95 5 °F (35 3 °C) (!) 39 18 104/79 92 %      Temp Source Heart Rate Source Patient Position - Orthostatic VS BP Location FiO2 (%)   10/10/18 1410 10/10/18 1410 10/10/18 1410 10/10/18 1410 --   Tympanic Monitor Lying Left arm       Pain Score       10/10/18 1445       Worst Possible Pain           Vitals:    10/10/18 1645 10/10/18 1700 10/10/18 1715 10/10/18 1730   BP: 135/99 133/87 141/73    Pulse: (!) 112 (!) 130 (!) 128 (!) 130   Patient Position - Orthostatic VS: Lying          Visual Acuity      ED Medications  Medications   ondansetron (ZOFRAN) injection 4 mg (4 mg Intravenous Given 10/10/18 1428)   HYDROmorphone (DILAUDID) injection 2 mg (2 mg Intravenous Given 10/10/18 1445)   sodium chloride 0 9 % bolus 1,000 mL (0 mL Intravenous Stopped 10/10/18 1700)   HYDROmorphone (DILAUDID) injection 1 mg (1 mg Intravenous Given 10/10/18 1649)   ondansetron (ZOFRAN) injection 4 mg (4 mg Intravenous Given 10/10/18 1647)   morphine injection 2 mg (2 mg Intravenous Given 10/10/18 1815)       Diagnostic Studies  Results Reviewed     Procedure Component Value Units Date/Time    Clostridium difficile toxin by PCR [[id number]]  (Normal) Collected:  10/10/18 1613    Lab Status:  Final result Specimen:  Stool from Per Rectum Updated:  10/11/18 1346     C difficile toxin by PCR NEGATIVE for C difficle toxin by PCR  APTT [[id number]]  (Normal) Collected:  10/10/18 1415    Lab Status:  Final result Specimen:  Blood from Arm, Right Updated:  10/10/18 1525     PTT 24 seconds     Protime-INR [[id number]]  (Normal) Collected:  10/10/18 1415    Lab Status:  Final result Specimen:  Blood from Arm, Right Updated:  10/10/18 1525     Protime 10 6 seconds      INR 1 01    Lactic Acid x2 [[id number]]  (Abnormal) Collected:  10/10/18 1415    Lab Status:  Final result Specimen:  Blood from Arm, Right Updated:  10/10/18 1513     LACTIC ACID 2 6 (HH) mmol/L     Narrative:         Result may be elevated if tourniquet was used during collection  Troponin I [[id number]]  (Normal) Collected:  10/10/18 1415    Lab Status:  Final result Specimen:  Blood from Arm, Right Updated:  10/10/18 1503     Troponin I <0 02 ng/mL     Comprehensive metabolic panel [[id number]]  (Abnormal) Collected:  10/10/18 1415    Lab Status:  Final result Specimen:  Blood from Arm, Right Updated:  10/10/18 1502     Sodium 143 mmol/L      Potassium 2 9 (L) mmol/L      Chloride 102 mmol/L      CO2 31 mmol/L      ANION GAP 10 mmol/L      BUN 21 mg/dL      Creatinine 1 23 mg/dL      Glucose 184 (H) mg/dL      Calcium 9 0 mg/dL      AST 24 U/L      ALT 34 U/L      Alkaline Phosphatase 122 (H) U/L      Total Protein 6 7 g/dL      Albumin 2 9 (L) g/dL      Total Bilirubin 0 30 mg/dL      eGFR 44 ml/min/1 73sq m     Narrative:         National Kidney Disease Education Program recommendations are as follows:  GFR calculation is accurate only with a steady state creatinine  Chronic Kidney disease less than 60 ml/min/1 73 sq  meters  Kidney failure less than 15 ml/min/1 73 sq  meters      CBC and differential [[id number]] Collected:  10/10/18 1415    Lab Status:  Final result Specimen:  Blood from Arm, Right Updated:  10/10/18 1440     WBC 4 98 Thousand/uL      RBC 4 23 Million/uL      Hemoglobin 12 8 g/dL      Hematocrit 40 8 %      MCV 97 fL      MCH 30 3 pg      MCHC 31 4 g/dL      RDW 14 1 %      MPV 9 3 fL      Platelets 975 Thousands/uL      nRBC 0 /100 WBCs      Neutrophils Relative 58 %      Immat GRANS % 0 %      Lymphocytes Relative 32 %      Monocytes Relative 9 %      Eosinophils Relative 1 %      Basophils Relative 0 %      Neutrophils Absolute 2 87 Thousands/µL      Immature Grans Absolute 0 02 Thousand/uL      Lymphocytes Absolute 1 58 Thousands/µL      Monocytes Absolute 0 46 Thousand/µL      Eosinophils Absolute 0 05 Thousand/µL      Basophils Absolute 0 00 Thousands/µL                  CT chest abdomen pelvis wo contrast   Final Result by Tessa Stark MD (10/10 1610)         1  Massive abdominal and pelvic ascites  2   Enlarged uterus  Mass along the left side of the uterus  Carcinomatosis  3   Extensive portal venous air  Air is present in multiple mesenteric veins in the abdomen  Cannot exclude small amounts of free air versus air in small veins  4   Small bowel obstruction  5   Extensive air within the walls of the stomach and multiple small bowel loops compatible with ischemic bowel and necrosis involving the stomach and multiple small bowel loops        * I personally telephoned this result to Raven Cohn on 10/10/2018 3:45 PM       Workstation performed: [id number]                    Procedures  ECG 12 Lead Documentation  Date/Time: 10/10/2018 4:33 PM  Performed by: Ronak Brownlee by: Paradise Menjivar     Indications / Diagnosis:  Abdominal pain  ECG reviewed by me, the ED Provider: yes    Patient location:  ED  Previous ECG:     Previous ECG:  Unavailable    Comparison to cardiac monitor: Yes    Interpretation:     Interpretation: abnormal    Rate:     ECG rate:  45    ECG rate assessment: bradycardic    Rhythm:     Rhythm: atrial fibrillation    Ectopy:     Ectopy: none    QRS:     QRS axis:  Normal  Conduction:     Conduction: abnormal      Abnormal conduction: incomplete RBBB    ST segments:     ST segments:  Normal  T waves:     T waves: normal             Phone Contacts  ED Phone Contact    ED Course                               MDM  Number of Diagnoses or Management Options  Gastric cancer ([facility] ):   Ischemic bowel disease ([place] [address] ):   Nausea & vomiting:   Diagnosis management comments: Patient's CT scan showed most likely a severe ischemic bowel  I spoke to the general surgeon who spoke to the patient and her  about the findings and the the fact that there is really no surgical intervention and most likely the patient will be need to be made comfort care only  Patient  state understanding and agreement with the assessment plan  Amount and/or Complexity of Data Reviewed  Clinical lab tests: ordered and reviewed  Tests in the radiology section of CPT®: ordered and reviewed      CritCare Time    Disposition  Final diagnoses:   Gastric cancer ([facility] )   Ischemic bowel disease ([place] [address] )   Nausea & vomiting     Time reflects when diagnosis was documented in both MDM as applicable and the Disposition within this note     Time User Action Codes Description Comment    10/10/2018  4:37 PM Litzy Napier Add [C16 9] Gastric cancer ([facility] )     10/10/2018  4:37 PM Litzy Napier Add [K55 9] Ischemic bowel disease ([place] [address] )     10/10/2018  4:42 PM Litzy Napier Add [R11 2] Nausea & vomiting       ED Disposition     ED Disposition Condition Comment    Admit  Case was discussed with Dr Gerson Gamez and the patient's admission status was agreed to be Admission Status: inpatient status to the service of Dr Gerson Gamez           Follow-up Information    None       Date, Time and Cause of Death    Date of Death:  10/11/18  Time of Death:   7:44 AM  Preliminary Cause of Death:  Gastric cancer ([place] [address] )  Entered by:  Dr Isidoro Rodriguez MR1 1 Becky Ruiz DO 10/11/18 08:10        Discharge Medication List as of 10/10/2018  6:56 PM      CONTINUE these medications which have NOT CHANGED    Details   cetirizine (ZyrTEC) 10 mg tablet Take 10 mg by mouth daily at bedtime, Historical Med      furosemide (LASIX) 80 mg tablet Take 80 mg by mouth every morning  , Historical Med      potassium chloride (K-DUR,KLOR-CON) 20 mEq tablet TK 1 T PO  D, Historical Med      amLODIPine (NORVASC) 5 mg tablet Take 5 mg by mouth every morning, Historical Med      esomeprazole (NexIUM) 40 MG capsule 40 mg every morning  , Starting Tue 6/5/2018, Historical Med      losartan (COZAAR) 100 MG tablet Take 100 mg by mouth every morning, Historical Med      metFORMIN (GLUCOPHAGE) 500 mg tablet Take 500 mg by mouth 2 (two) times a day with meals, Historical Med      ondansetron (ZOFRAN) 8 mg tablet Take 1 tablet (8 mg total) by mouth every 8 (eight) hours as needed for nausea or vomiting, Starting Tue 10/2/2018, Normal      oxyCODONE-acetaminophen (PERCOCET) 5-325 mg per tablet Take 1 tablet by mouth every 6 (six) hours as needed for moderate pain Max Daily Amount: 4 tablets, Starting Tue 9/11/2018, Normal      pregabalin (LYRICA) 200 MG capsule Take 200 mg by mouth 2 (two) times a day, Historical Med           No discharge procedures on file      ED Provider  Electronically Signed by           Kate Chaudhary MD  10/16/18 [id number]

## 2018-10-10 NOTE — DISCHARGE SUMMARY
Patient admitted under comfort care measures after CT scan done in the ER showed findings suggestive of ischemic bowel in necrosis involving the stomach and multiple small bowel loops  please refer to H and P for further details    Patient evaluated by hospice and qualifies for inpatient hospice

## 2018-10-10 NOTE — H&P
History and Physical - [facility]    Patient Information: Jeremias Gottlieb 68 y o  female MRN: [id number]  Unit/Bed#: [address] [place] [address] Encounter: [id number]  Admitting Physician: Lilly Portillo DO  PCP: Shikha Cartwright MD  Date of Admission:  10/10/18        Hospital Problem List:     Active Problems:    Admission for hospice care      Assessment/Plan:    Admission for hospice care   Assessment & Plan    Patient being admitted under inpatient hospice  CT scan done in the ER showed carcinomatosis, massive ascites, extensive portal venous air and air in multiple mesenteric veins in abdomen, small bowel obstruction and findings suggesting ischemic bowel and necrosis involving stomach and multiple small bowel loops  Patient was seen by surgery in the ER  After discussion with surgery, patient and  opted for comfort measures only  They understand that without any treatment, patient will   She was seen by hospice representative and qualifies for inpatient hospice  Patient started on IV Dilaudid scheduled and as needed for pain management as patient reports severe pain even after receiving morphine  IV Ativan, Zofran as needed             VTE Prophylaxis: Pharmacologic VTE Prophylaxis contraindicated due to Comfort measures only  / reason for no mechanical VTE prophylaxis Comfort measures only   Code Status: Level 4    Anticipated Length of Stay:  Patient will be admitted on an Hospice basis with an anticipated length of stay of atleast 2 midnights  Justification for Hospital Stay:  Hospice care patient    Total Time for Visit, including Counseling / Coordination of Care: 20 min  Greater than 50% of this total time spent on direct patient counseling and coordination of care  Chief Complaint:     No chief complaint on file  of Present Illness:    Jeremias Gottlieb is a 68 y o  female who presents with abdominal pain, distension    Patient with known history of gastric adenocarcinoma with metastasis and frequent malignant ascites requiring paracentesis in the past   Patient states that since Sunday she has been having abdominal pain, distension, nausea, vomiting and diarrhea  Her symptoms have gradually worsened  She reported that the pain has never been this severe in the past   CT scan done in the ER showed massive ascites, extensive portal venous air, small bowel obstruction and findings suggestive of ischemic bowel and necrosis  Patient was seen by surgery in the ER and after discussion patient and family opted for comfort measures  Hospice was consulted and patient qualifies for inpatient hospice    Review of Systems:    Review of Systems   Constitutional: Positive for appetite change  Negative for chills and fever  HENT: Positive for trouble swallowing  Negative for sore throat  Eyes: Negative for photophobia and visual disturbance  Respiratory: Positive for shortness of breath  Cardiovascular: Positive for chest pain and leg swelling (chronic)  Gastrointestinal: Positive for abdominal distention, abdominal pain, diarrhea, nausea and vomiting  Genitourinary: Negative for dysuria, frequency and hematuria  Musculoskeletal: Positive for back pain  Skin: Negative for wound  Neurological: Positive for light-headedness  Negative for syncope and speech difficulty  Psychiatric/Behavioral: Negative for agitation         Past Medical and Surgical History:     Past Medical History:   Diagnosis Date    Abdomen enlarged     had paracentesis-8/28/18-malignant ascites    Arthritis     toes    Cancer ([facility] )     unknown site at time of interview    Cellulitis     Diabetes mellitus ([facility] )     type 2    Diarrhea     Fever     low grade for over a month     History of colonoscopy     History of esophagogastroduodenoscopy (EGD)     Hypertension     Kidney disorder     CT scan showed stones-6/2018    Mass     ovary or uterus    Obesity     Peripheral neuropathy     Stomach ache     intermittent- waves of pain    UTI (urinary tract infection)     Dx on 18 per the pt post urologist call    Venous insufficiency (chronic) (peripheral)        Past Surgical History:   Procedure Laterality Date    CATARACT EXTRACTION Bilateral     CHOLECYSTECTOMY      lap    COLONOSCOPY N/A 2018    Procedure: COLONOSCOPY;  Surgeon: Brigitte Mcfarlane MD;  Location: [place] [address] GI LAB; Service: Gastroenterology    IR PARACENTESIS  2018    IR PARACENTESIS  9/10/2018    IR PARACENTESIS  2018    IR PARACENTESIS  2018    IR PARACENTESIS  10/4/2018    IR PORT PLACEMENT  2018    KNEE ARTHROSCOPY Left     x5    KNEE ARTHROSCOPY Right     x2    KS EGD TRANSORAL BIOPSY SINGLE/MULTIPLE N/A 2018    Procedure: ESOPHAGOGASTRODUODENOSCOPY (EGD); Surgeon: Brigitte Mcfarlane MD;  Location: [facility] GI LAB; Service: Gastroenterology    KS EGD TRANSORAL BIOPSY SINGLE/MULTIPLE N/A 2018    Procedure: ESOPHAGOGASTRODUODENOSCOPY (EGD); Surgeon: Brigitte Mcfarlane MD;  Location: [facility] GI LAB; Service: Gastroenterology    KS LAP,DIAGNOSTIC ABDOMEN N/A 2018    Procedure: diagnostic laparoscopy, peritoneal biopsy ;  Surgeon: Peter Romero MD;  Location: [facility];  Service: Gynecology Oncology       Meds/Allergies:    PTA meds:   Prior to Admission Medications   Prescriptions Last Dose Informant Patient Reported? Taking?    amLODIPine (NORVASC) 5 mg tablet  Self Yes No   Sig: Take 5 mg by mouth every morning   cetirizine (ZyrTEC) 10 mg tablet  Self Yes No   Sig: Take 10 mg by mouth daily at bedtime   esomeprazole (NexIUM) 40 MG capsule  Self Yes No   Si mg every morning     furosemide (LASIX) 80 mg tablet  Self Yes No   Sig: Take 80 mg by mouth every morning     losartan (COZAAR) 100 MG tablet  Self Yes No   Sig: Take 100 mg by mouth every morning   metFORMIN (GLUCOPHAGE) 500 mg tablet  Self Yes No   Sig: Take 500 mg by mouth 2 (two) times a day with meals   ondansetron (ZOFRAN) 8 mg tablet   No No   Sig: Take 1 tablet (8 mg total) by mouth every 8 (eight) hours as needed for nausea or vomiting   oxyCODONE-acetaminophen (PERCOCET) 5-325 mg per tablet   No No   Sig: Take 1 tablet by mouth every 6 (six) hours as needed for moderate pain Max Daily Amount: 4 tablets   potassium chloride (K-DUR,KLOR-CON) 20 mEq tablet  Self Yes No   Sig: TK 1 T PO  D   pregabalin (LYRICA) 200 MG capsule  Self Yes No   Sig: Take 200 mg by mouth 2 (two) times a day      Facility-Administered Medications: None       Allergies: Allergies   Allergen Reactions    Sulfa Antibiotics Hives     History:     Marital Status: /Civil Union     Substance Use History:   History   Alcohol Use    1 2 oz/week    2 Glasses of wine per week     Comment: daily     History   Smoking Status    Former Smoker    Packs/day: 2 00    Years: 50 00    Types: Cigarettes    Quit date: 06/2018   Smokeless Tobacco    Never Used     History   Drug Use No       Family History:    Family History   Problem Relation Age of Onset    Cancer Mother         breast age 48    Early death Mother     Alcohol abuse Father     Heart disease Father         MI    Cancer Daughter         skin    Hypertension Son     No Known Problems Son        Physical Exam:     Vitals:        Physical Exam   Constitutional: She is oriented to person, place, and time  In distress due to pain   HENT:   Head: Normocephalic and atraumatic  Eyes: EOM are normal  Right eye exhibits no discharge  Left eye exhibits no discharge  Cardiovascular: Regular rhythm  Tachycardic   Pulmonary/Chest: Effort normal  She has no wheezes  She has no rales  Tachypneic   Abdominal: She exhibits distension  There is tenderness (generalized but mostly in epigastric region)  There is no guarding  Musculoskeletal: She exhibits edema (B/L L  E)  Neurological: She is alert and oriented to person, place, and time  No cranial nerve deficit     Skin: She is not diaphoretic  Lab Results: I have personally reviewed pertinent reports  Results from last 7 days  Lab Units 10/10/18  1415   WBC Thousand/uL 4 98   HEMOGLOBIN g/dL 12 8   HEMATOCRIT % 40 8   PLATELETS Thousands/uL 275   NEUTROS PCT % 58   LYMPHS PCT % 32   MONOS PCT % 9   EOS PCT % 1       Results from last 7 days  Lab Units 10/10/18  1415   SODIUM mmol/L 143   POTASSIUM mmol/L 2 9*   CHLORIDE mmol/L 102   CO2 mmol/L 31   BUN mg/dL 21   CREATININE mg/dL 1 23   CALCIUM mg/dL 9 0   ALK PHOS U/L 122*   ALT U/L 34   AST U/L 24       Results from last 7 days  Lab Units 10/10/18  1415   INR  1 01       Imaging: I have personally reviewed pertinent reports  Ct Chest Abdomen Pelvis Wo Contrast    Result Date: 10/10/2018  Narrative: CT CHEST, ABDOMEN AND PELVIS WITHOUT IV CONTRAST INDICATION:   Abdominal pain  COMPARISON:  06/04/2018 TECHNIQUE: CT examination of the chest, abdomen and pelvis was performed without intravenous contrast   Axial, sagittal, and coronal 2D reformatted images were created from the source data and submitted for interpretation  Radiation dose length product (DLP) for this visit:  1108 09 mGy-cm   This examination, like all CT scans performed in the [facility], was performed utilizing techniques to minimize radiation dose exposure, including the use of iterative reconstruction and automated exposure control  Enteric contrast was not administered  FINDINGS: CHEST LUNGS:  Minimal atelectasis is present at the left lung base  PLEURA:  Small left pleural effusion  HEART/GREAT VESSELS:  Cardiomegaly  Coronary artery calcifications  MEDIASTINUM AND PRIYANKA:  Hiatal hernia  Fluid in the esophagus which is distended  CHEST WALL AND LOWER NECK:   Port-A-Cath on the right  ABDOMEN LIVER/BILIARY TREE:  Extensive portal venous air  GALLBLADDER:  Cholecystectomy  SPLEEN:  Unremarkable  PANCREAS:  Unremarkable  ADRENAL GLANDS:  Right adrenal nodule   KIDNEYS/URETERS: Bilateral renal atrophy  Bilateral renal calculi  Left hydronephrosis unchanged  STOMACH AND BOWEL:  Air within the walls of the stomach  The stomach is filled with fluid  There are multiple dilated proximal small bowel loops present  Air within the bowel wall  No large bowel distention  APPENDIX:  No findings to suggest appendicitis  ABDOMINOPELVIC CAVITY:  Large amount of abdominal and pelvic ascites  Extensive infiltration within the mesentery compatible with carcinomatosis  VESSELS:  Air within multiple mesenteric veins  Air within the portal vein  PELVIS REPRODUCTIVE ORGANS:  Enlarged uterus with a mass adjacent to the left side of the uterus  Large calcification on the right side of the uterus compatible with a fibroid  URINARY BLADDER:  Unremarkable  ABDOMINAL WALL/INGUINAL REGIONS:  Unremarkable  OSSEOUS STRUCTURES:  Degenerative changes in the thoracic and lumbar spine particularly at the L4/L5 level  Impression: 1  Massive abdominal and pelvic ascites  2   Enlarged uterus  Mass along the left side of the uterus  Carcinomatosis  3   Extensive portal venous air  Air is present in multiple mesenteric veins in the abdomen  Cannot exclude small amounts of free air versus air in small veins  4   Small bowel obstruction  5   Extensive air within the walls of the stomach and multiple small bowel loops compatible with ischemic bowel and necrosis involving the stomach and multiple small bowel loops  * I personally telephoned this result to Lord Damon on 10/10/2018 3:45 PM  Workstation performed: [id number]     Ir Paracentesis    Result Date: 10/4/2018  Narrative: EXAMINATION: Ultrasound-guided paracentesis INDICATION: 52-year-old female with history of adenocarcinoma of GI origin returns with recurrent ascites  IMAGES:  1 ANESTHESIA: Local lidocaine PROCEDURE:  The patient was identified verbally and by wristband  Timeout was performed  Informed consent was obtained   Following obtaining informed consent, the patient was prepped and draped in the usual sterile fashion  1% local lidocaine was infiltrated in the skin and subcutaneous tissues of the left lower quadrant abdomen  Under ultrasound guidance, a 5-Telugu Yueh centesis needle catheter was advanced into the left lower quadrant peritoneal space  Images were saved  The needle portion was removed, leaving the catheter in place  Catheter was connected to a Vacutainer and 4 35 L of suzanna-colored fluid was removed  Catheter was removed and bandage applied  The patient tolerated the procedure well without complication  The patient left the IR department in stable condition  Findings: 1  Preprocedure ultrasound showed large ascites  2   Successful removal of 4 35 L of suzanna-colored ascites fluid  Impression: Impression: Successful paracentesis with 4 35 L of suzanna-colored ascites fluid removed  Workstation performed: [id number]     Ir Paracentesis    Result Date: 9/26/2018  Narrative: Ultrasound-guided paracentesis Clinical History: Gastric adenocarcinoma, recurrent ascites  Plan for port placement and paracentesis  Decision made for paracentesis 1st as the patient has difficulty lying supine due to dyspnea  Procedure: After explaining the risks and benefits of the procedure to the patient, informed consent was obtained  Ultrasound used to localize the left lower quadrant ascites  The overlying skin was prepped and draped in usual sterile fashion and local anesthesia was obtained with the 1% lidocaine solution  A 5 Western Shi Yueh needle was advanced under live ultrasound guidance until fluid was aspirated  Approximately 3000 cc' s of clear, yellow fluid was aspirated  The patient tolerated the procedure well, she was then brought to the fluoroscopy suite for port placement  An albumin infusion was begun during port placement  Impression: Impression: Ultrasound-guided paracentesis   Workstation performed: [id number]     Ir Paracentesis    Result Date: 9/21/2018  Narrative: EXAMINATION: Ultrasound-guided paracentesis INDICATION: 46-year-old female with history of adenocarcinoma of GI origin presents with recurrent ascites  IMAGES:  2 ANESTHESIA: Local lidocaine PROCEDURE:  The patient was identified verbally and by wristband  Timeout was performed  Informed consent was obtained  Following obtaining informed consent, the patient was prepped and draped in the usual sterile fashion  1% local lidocaine was infiltrated in the skin and subcutaneous tissues of the left lower quadrant abdomen  Under ultrasound guidance, a 5-Brazilian Yueh centesis needle catheter was advanced into the left lower quadrant peritoneal space  Images were saved  The needle component was removed leaving the catheter in place  Catheter was connected to a Vacutainer and a total of 5 5 L of suzanna-colored ascites was removed  Following removal of the ascites fluid, the needle was removed and bandage applied  The patient tolerated the procedure well without complication  The patient left the IR department in stable condition  Findings: 1  Preprocedure ultrasound showed large ascites  2   Successful paracentesis with 5 5 L of suzanna-colored ascites fluid removed  Impression: Impression: Successful paracentesis with 5 5 L of ascites fluid removed  Workstation performed: [id number]     Ir Port Placement    Result Date: 9/26/2018  Narrative: Chest port placement  Clinical History: Gastric adenocarcinoma, port placement for chemotherapy  Fluoro time: 0 8 minutes Number of Images: 5 Conscious sedation time: 5 0 minutes Technique: The patient was brought to the interventional radiology preprocedure area and identified verbally and by wristband  After discussion of the procedure and its details, risks, benefits, and alternatives both verbal and written informed consent was obtained from the patient and placed in the chart  A paracentesis was performed 1st, this is dictated separately    The patient was then brought to the interventional radiology angiography suite and placed supine on the table  The right internal jugular vein was evaluated as a potential access site with ultrasound  The vessel was found to be patent and compressible  An 8-Canadian Midsize Dignity  port was selected for the procedure  The right neck and upper chest were prepped and draped in the usual sterile fashion  All elements of maximal sterile barrier technique were followed (cap, mask, sterile gown, sterile gloves, large sterile sheet, hand hygiene and 2% chlorhexidine for cutaneous antisepsis)  Sterile ultrasound technique with sterile gel and sterile probe covers were also utilized  A preprocedure timeout was performed per standard department protocol  Under ultrasound guidance, lidocaine was administered to the skin and a small skin incision was made  Under ultrasound guidance, the right internal jugular vein was accessed using single wall Seldinger technique  Static images of real time needle entry into the vessel were obtained  A 0 018 wire was then advanced through the needle into the central venous system  The needle was removed, and a 5 Canadian coaxial dilator was inserted  Next, attention was turned to the right chest and a site for the port pocket was selected  This area was then anesthetized with lidocaine, as well as the projected tunnel  A subcutaneous pocket was created in the skin of the upper chest for the port reservoir utilizing a surgical incision  The port catheter was then tunneled under the skin of the upper chest  Attention was then turned to the 5-Canadian coaxial dilator  The microwire and inner dilator were removed  A heavy wire was inserted through the outer dilator and a 9 Canadian peel-away sheath was inserted over the wire  The catheter was advanced through the peel-away and the peel-away was removed  The catheter tip was then positioned in the right atrium under fluoroscopic control   The catheter was connected to the port, and the port inserted into the subcutaneous pocket  The port was then flushed and aspirated successfully and 100 unit heparin/cc solution was administered into the lumen  The pocket was closed with interrupted 3-0 and 4-0 Vicryl suture and Histoacryl  A sterile dressing was applied  The neck incision was closed with 3-0 Vicryl suture  and Histoacryl  Findings: Patent and compressible right internal jugular vein  Successful 8-Kyrgyz power injectable port placement with catheter tip at the superior cavoatrial junction  Impression: Impression: Successful ultrasound and fluoroscopically guided placement of an 8-Kyrgyz Midsize Dignity power injectable chest port via the right internal jugular vein  The tip of the catheter projects in the right atrium and may be used immediately  Workstation performed: [id number]       No orders to display       Allscripts/EPIC Records Reviewed: Yes     ** Please Note: "This note has been constructed using a voice recognition system  Therefore there may be syntax, spelling, and/or grammatical errors   Please call if you have any questions  "**

## 2018-10-10 NOTE — ASSESSMENT & PLAN NOTE
She was admitted under inpatient hospice  CT scan done in the ER showed carcinomatosis, massive ascites, extensive portal venous air and air in multiple mesenteric veins in abdomen, small bowel obstruction and findings suggesting ischemic bowel and necrosis involving stomach and multiple small bowel loops  She was started on IV Dilaudid scheduled and as needed for pain management as she reported severe pain even after receiving morphine    IV Ativan, Zofran as needed  She  this morning

## 2018-10-10 NOTE — ASSESSMENT & PLAN NOTE
Admit patient under comfort measures  CT scan done in the ER showed carcinomatosis, massive ascites, extensive portal venous air and air in multiple mesenteric veins in abdomen, small bowel obstruction and findings suggesting ischemic bowel and necrosis involving stomach and multiple small bowel loops  Patient was seen by surgery in the ER  After discussion with surgery, patient and  opted for comfort measures only  They understand that without any treatment, patient will   Patient started on IV Dilaudid scheduled and as needed for pain management as patient reports severe pain even after receiving morphine    IV Ativan, Zofran as needed  Hospice consult

## 2018-10-10 NOTE — ED NOTES
Patient with less nausea, intermittent vomiting of tan fluid  Second dose of Zofran is helping patient   Patient would like to go to floor to get off stretcher,  remains at bedside  Report called to floor        Carrol Victor RN  10/10/18 [id number]

## 2018-10-11 PROBLEM — Z51.5 ADMISSION FOR HOSPICE CARE: Status: RESOLVED | Noted: 2018-01-01 | Resolved: 2018-01-01

## 2018-10-11 NOTE — TREATMENT PLAN
Called  twice around 7:48 and 7:50 am to notify him about patient's death but no answer   Left Voicemail to call back

## 2018-10-11 NOTE — NURSING NOTE
Pt was on Hospice care, overnight pt had severe pain iv Dilaudid given as prescribed, Patients condition deteriorated and pronounced dead at [id number] by the resident on call, Hospice nurse notified, [employer] work notified,family will be notified by Clint Gopi

## 2018-10-11 NOTE — DISCHARGE SUMMARY
Discharge Summary - [facility] Internal Medicine    Patient Information: Abena Cornea 68 y o  female MRN: [id number]  Unit/Bed#: [address] [address] Encounter: [id number]    Discharging Physician / Practitioner: Abdiel Campbell DO  PCP: Jane Olson MD  Admission Date: 10/10/2018  Discharge Date: 10/11/18    Reason for Admission: No chief complaint on file  Discharge Diagnoses:     Principal Problem (Resolved): Admission for hospice care  Active Problems:    * No active hospital problems  *        * Admission for hospice care-resolved as of 10/11/2018   Assessment & Plan    She was admitted under inpatient hospice  CT scan done in the ER showed carcinomatosis, massive ascites, extensive portal venous air and air in multiple mesenteric veins in abdomen, small bowel obstruction and findings suggesting ischemic bowel and necrosis involving stomach and multiple small bowel loops  She was started on IV Dilaudid scheduled and as needed for pain management as she reported severe pain even after receiving morphine  IV Ativan, Zofran as needed  She  this morning         Consultations During Hospital Stay:  None    Procedures Performed:     · none    Significant Findings:     · See hospital course and above    Imaging while in hospital:    Ct Chest Abdomen Pelvis Wo Contrast    Result Date: 10/10/2018  Narrative: CT CHEST, ABDOMEN AND PELVIS WITHOUT IV CONTRAST INDICATION:   Abdominal pain  COMPARISON:  2018 TECHNIQUE: CT examination of the chest, abdomen and pelvis was performed without intravenous contrast   Axial, sagittal, and coronal 2D reformatted images were created from the source data and submitted for interpretation  Radiation dose length product (DLP) for this visit:  1108 09 mGy-cm     This examination, like all CT scans performed in the [facility], was performed utilizing techniques to minimize radiation dose exposure, including the use of iterative reconstruction and automated exposure control  Enteric contrast was not administered  FINDINGS: CHEST LUNGS:  Minimal atelectasis is present at the left lung base  PLEURA:  Small left pleural effusion  HEART/GREAT VESSELS:  Cardiomegaly  Coronary artery calcifications  MEDIASTINUM AND PRIYANKA:  Hiatal hernia  Fluid in the esophagus which is distended  CHEST WALL AND LOWER NECK:   Port-A-Cath on the right  ABDOMEN LIVER/BILIARY TREE:  Extensive portal venous air  GALLBLADDER:  Cholecystectomy  SPLEEN:  Unremarkable  PANCREAS:  Unremarkable  ADRENAL GLANDS:  Right adrenal nodule  KIDNEYS/URETERS:  Bilateral renal atrophy  Bilateral renal calculi  Left hydronephrosis unchanged  STOMACH AND BOWEL:  Air within the walls of the stomach  The stomach is filled with fluid  There are multiple dilated proximal small bowel loops present  Air within the bowel wall  No large bowel distention  APPENDIX:  No findings to suggest appendicitis  ABDOMINOPELVIC CAVITY:  Large amount of abdominal and pelvic ascites  Extensive infiltration within the mesentery compatible with carcinomatosis  VESSELS:  Air within multiple mesenteric veins  Air within the portal vein  PELVIS REPRODUCTIVE ORGANS:  Enlarged uterus with a mass adjacent to the left side of the uterus  Large calcification on the right side of the uterus compatible with a fibroid  URINARY BLADDER:  Unremarkable  ABDOMINAL WALL/INGUINAL REGIONS:  Unremarkable  OSSEOUS STRUCTURES:  Degenerative changes in the thoracic and lumbar spine particularly at the L4/L5 level  Impression: 1  Massive abdominal and pelvic ascites  2   Enlarged uterus  Mass along the left side of the uterus  Carcinomatosis  3   Extensive portal venous air  Air is present in multiple mesenteric veins in the abdomen  Cannot exclude small amounts of free air versus air in small veins  4   Small bowel obstruction   5   Extensive air within the walls of the stomach and multiple small bowel loops compatible with ischemic bowel and necrosis involving the stomach and multiple small bowel loops  * I personally telephoned this result to Raven Lalit on 10/10/2018 3:45 PM  Workstation performed: [id number]     Test Results Pending at Discharge (will require follow up):   · N/A     Outpatient Tests Requested:  · N/A    Complications:  See hospital course and above    Hospital Course: Saba Mendosa is a 68 y o  female patient who originally presented to the hospital on 10/10/2018 due to abdominal pain, distension  Patient with known history of gastric adenocarcinoma with metastasis and frequent malignant ascites requiring paracentesis in the past   She reported that since  she was having abdominal pain, distension, nausea, vomiting and diarrhea  Her symptoms gradually worsened  CT scan done in the ER showed massive ascites, extensive portal venous air, small bowel obstruction and findings suggestive of ischemic bowel and necrosis  She was seen by surgery in the ER and after discussion patient and family opted for comfort measures  Hospice was consulted and she qualified for inpatient hospice  She  this AM   Called  twice to notify him about patient's death but no answer  Left voicemail for him to call back  Received call back from  at approximately 8:43 am and notified him regarding patient passing away  Condolences offered    Please see above list of diagnoses and related plan for additional information         Condition at Discharge:     Discharge Day Visit / Exam:     Subjective:  N/A    Vitals: Blood Pressure: 108/52 (10/11/18 0015)  Pulse: 76 (10/11/18 0015)  Temperature: (!) 97 °F (36 1 °C) (10/11/18 0015)  Temp Source: Temporal (10/11/18 0015)  Respirations: 22 (10/11/18 0015)  SpO2: 96 % (10/11/18 0015)  Exam:   Physical Exam    Please see death note    Discharge instructions/Information to patient and family:(Discharge Medications and Follow up):   See after visit summary for information provided to patient and family  Provisions for Follow-Up Care:  See after visit summary for information related to follow-up care and any pertinent home health orders  Disposition:      Discharge Statement:  I spent 15 minutes discharging the patient  This time was spent on the day of discharge  Discharge Medications:  See after visit summary for reconciled discharge medications provided to patient and family  ** Please Note: "This note has been constructed using a voice recognition system  Therefore there may be syntax, spelling, and/or grammatical errors   Please call if you have any questions  "**

## 2018-10-11 NOTE — PROGRESS NOTES
Pt arrived to unit from ER via stretcher, pivoted from stretcher to chair with assistance  Pt incontinent of stool with blood noted  Abdomin extremely distended and firm  Pt assisted into position of comfort   at bedside

## 2018-10-11 NOTE — DEATH NOTE
INPATIENT DEATH NOTE  Marysol Zarate 68 y o  female MRN: [id number]  Unit/Bed#: [address] [place] [address] Encounter: [id number]             PHYSICAL EXAM:  Unresponsive to noxious stimuli, Spontaneous respirations absent, Breath sounds absent, Carotid pulse absent, Heart sounds absent, Pupillary light reflex absent and Corneal blink reflex absent    Medical Examiner notification criteria:  Patient  within 24 hours of arrival to hospital     Autopsy Options:  Decision for post-mortem examination not yet made by next of kin      Primary Service Attending Physician notified?:  yes - Attending:  Yousif Shipman, DO    Physician responsible for completing Discharge Summary:  Dr Yousif Shipman

## 2018-10-12 NOTE — UTILIZATION REVIEW
Initial Clinical Review    PATIENT ADMITTED INPATIENT > COMFORT MEASURES D/W PATIENT AND FAMILY , 7 PM CONVERTED TO HOSPICE CARE AND  7:41    Admission: Date/Time/Statement: 10/10/18 @ [phone]     Orders Placed This Encounter   Procedures    Inpatient Admission (expected length of stay for this patient is greater than two midnights)     Standing Status:   Standing     Number of Occurrences:   1     Order Specific Question:   Admitting Physician     Answer:   Eduardo Marie     Order Specific Question:   Level of Care     Answer:   Med Surg [16]     Order Specific Question:   Estimated length of stay     Answer:   More than 2 Midnights     Order Specific Question:   Certification     Answer:   I certify that inpatient services are medically necessary for this patient for a duration of greater than two midnights  See H&P and MD Progress Notes for additional information about the patient's course of treatment  ED: Date/Time/Mode of Arrival:   ED Arrival Information     Expected Arrival Acuity Means of Arrival Escorted By Service Admission Type    - 10/10/2018 14:02 Emergent Amb[facility] [address] [facility]    Arrival Complaint    abdominal pain          Chief Complaint:   Chief Complaint   Patient presents with    Abdominal Pain     Arrives BLS with epigastric pain , nausea, vomiting and diarrhea for several days  History of Shirley Payment is a 68 y o  female who presents with abdominal pain, distension  Patient with known history of gastric adenocarcinoma with metastasis and frequent malignant ascites requiring paracentesis in the past   Patient states that since  she has been having abdominal pain, distension, nausea, vomiting and diarrhea  Her symptoms have gradually worsened    She reported that the pain has never been this severe in the past   CT scan done in the ER showed massive ascites, extensive portal venous air, small bowel obstruction and findings suggestive of ischemic bowel and necrosis  Patient was seen by surgery in the ER and after discussion patient and family opted for comfort measures    In distress due to pain , Tachycardic   Tachypneic   Abdominal: She exhibits distension  There is tenderness (generalized but mostly in epigastric region)  ED Vital Signs:   ED Triage Vitals   Temperature Pulse Respirations Blood Pressure SpO2   10/10/18 1410 10/10/18 1410 10/10/18 1410 10/10/18 1410 10/10/18 1410   (!) 95 5 °F (35 3 °C) (!) 39 18 104/79 92 %      Temp Source Heart Rate Source Patient Position - Orthostatic VS BP Location FiO2 (%)   10/10/18 1410 10/10/18 1410 10/10/18 1410 10/10/18 1410 --   Tympanic Monitor Lying Left arm       Pain Score       10/10/18 1445       Worst Possible Pain        Wt Readings from Last 1 Encounters:   10/10/18 113 kg (250 lb)       Vital Signs (abnormal): Abnormal Labs/Diagnostic Test Results:   K 2 9 ALK PHOS 122   CT ABDOMEN Massive abdominal and pelvic ascites  2   Enlarged uterus  Mass along the left side of the uterus  Carcinomatosis  3   Extensive portal venous air  Air is present in multiple mesenteric veins in the abdomen  Cannot exclude small amounts of free air versus air in small veins  4   Small bowel obstruction  5   Extensive air within the walls of the stomach and multiple small bowel loops compatible with ischemic bowel and necrosis involving the stomach and multiple small bowel loops    ED Treatment:   Medication Administration from 10/10/2018 1402 to 10/10/2018 1750       Date/Time Order Dose Route Action Action by Comments     10/10/2018 1428 ondansetron (ZOFRAN) injection 4 mg 4 mg Intravenous Given Biju Cassidy RN      10/10/2018 1445 HYDROmorphone (DILAUDID) injection 2 mg 2 mg Intravenous Given Biju Cassidy RN      10/10/2018 1700 sodium chloride 0 9 % bolus 1,000 mL 0 mL Intravenous Stopped Biju Cassidy RN      10/10/2018 1545 sodium chloride 0 9 % bolus 1,000 mL 1,000 mL Intravenous [address] Marline Lowery RN      10/10/2018 1649 HYDROmorphone (DILAUDID) injection 1 mg 1 mg Intravenous Given Marline Lowery RN      10/10/2018 1647 ondansetron (ZOFRAN) injection 4 mg 4 mg Intravenous Given Marline Lowery RN           Past Medical/Surgical History: Active Ambulatory Problems     Diagnosis Date Noted    Elevated CA-125 06/28/2018    Endometrial mass 06/28/2018    Cardiopathy 06/28/2018    Nephrolithiasis 06/28/2018    Chronic venous insufficiency 06/28/2018    Smoking 06/28/2018    Generalized edema 07/03/2018    History of recurrent UTIs 07/26/2018    S/P laparoscopic procedure 07/26/2018    Peritoneal carcinomatosis ([facility] [address] ) 07/26/2018    Malignant ascites 07/26/2018     Resolved Ambulatory Problems     Diagnosis Date Noted    Lymphangitis 06/28/2018    Preop examination 07/03/2018     Past Medical History:   Diagnosis Date    Abdomen enlarged     Arthritis     Cancer ([facility] [address] )     Cellulitis     Diabetes mellitus ([facility] [address] )     Diarrhea     Fever     History of colonoscopy     History of esophagogastroduodenoscopy (EGD)     Hypertension     Kidney disorder     Mass     Obesity     Peripheral neuropathy     Stomach ache     UTI (urinary tract infection)     Venous insufficiency (chronic) (peripheral)        Admitting Diagnosis: Ischemic bowel disease (HCC) [K55 9]  Gastric cancer (HCC) [C16 9]  Abdominal pain [R10 9]  Nausea & vomiting [R11 2]    Age/Sex: 68 y o  female    Assessment/Plan:  Comfort measures only status   Assessment & Plan     Admit patient under comfort measures  CT scan done in the ER showed carcinomatosis, massive ascites, extensive portal venous air and air in multiple mesenteric veins in abdomen, small bowel obstruction and findings suggesting ischemic bowel and necrosis involving stomach and multiple small bowel loops  Patient was seen by surgery in the ER    After discussion with surgery, patient and  opted for comfort measures only  They understand that without any treatment, patient will   Patient started on IV Dilaudid scheduled and as needed for pain management as patient reports severe pain even after receiving morphine  IV Ativan, Zofran as needed  Hospice consult   Ischemic bowel disease [facility])   Assessment & Plan     Patient seen by surgery in the ER  Management as noted above      Gastric adenocarcinoma [facility])   Assessment & Plan     With metastasis  Management as noted above      Hypokalemia   Assessment & Plan     Patient is admitted under comfort measures  Management as noted above      Malignant ascites   Assessment & Plan     Management as noted above  Has had paracentesis previously      VTE Prophylaxis: Pharmacologic VTE Prophylaxis contraindicated due to Comfort measures only  / reason for no mechanical VTE prophylaxis Comfort measures only   Code Status: Level 4 - Comfort Care   Anticipated Length of Stay:  Patient will be admitted on an Inpatient basis with an anticipated length of stay of atleast 2 midnights  Justification for Hospital Stay:  Comfort care patient, ischemic bowel     SURGERY CONSULT  Presents here today with abdominal pain nausea vomiting of for 2 days duration which got worse and the  Workup in the ER revealed extensive for CT scan findings for infarcted stomach and proximal small bowel  Surgical consult was obtained for possible emergent surgery   Advance Non-operable stomach cancer  -  Moderately differentiated adenocarcinoma  with the metastasis with peritoneal carcinomatosis and massive ascites  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  Lactic acidosis  Extensive Portal venous Air and mesenteric venous Air  Small bowel obstruction  Abdominal pain  Hypokalemia     Plan:  CT scan findings were discussed with Dr Jenifer PILLAI  Radiologist and the the CT scans findings were explained to the patient and her  in detail  In view of off non-operable stomach cancer Massive ascites carcinomatosis of omentum  An large pelvic masses with possible gangrene of the stomach and small bowel An large stomach tumor with the portal venous air and mesenteric venous air compatible with gangrene of the stomach and small bowel      Patient was explained for possible laparotomy which may be possibly open and close considering the extent of for bowel ischemia and the stomach ischemia  An advanced cancer  Surgical intervention will not be Able to cure patient's illness  The patient and her  decided for comfort and hospice care  Dr Malina Dean was informed for possible admission for hospice care  Care was discussed with Dr Sonia Peter MD gastrologist   We will consult Dr Magdy Murguia / and hospice for end of life care     Admission Orders:  GMF  C DIFF TOXIN  CT CHEST ABDOMEN  IV DILAUDID  CONSULT ACUTE CARE SURGERY  IV MORPHINE 2MG   IV ZOFRAN   CONSULT HOSPICE  IVNS  Scheduled Meds:   Continuous Infusions:   No current facility-administered medications for this encounter     PRN Meds:

## 2018-10-15 LAB
ATRIAL RATE: 84 BPM
QRS AXIS: -19 DEGREES
QRSD INTERVAL: 96 MS
QT INTERVAL: 490 MS
QTC INTERVAL: 423 MS
T WAVE AXIS: 3 DEGREES
VENTRICULAR RATE: 45 BPM

## 2019-08-18 NOTE — SEDATION DOCUMENTATION
5500ml dark yellow fluid removed  Transferred to infusion center via protocol 
Consent form signed 
Tolerating drain well 
Gen: no changes in weight, fatigue, night sweats, appetite, or fever  Eyes: no changes in vision, diplopia, or floaters  ENT: no epistaxis, sinus pain, gingival bleeding, odynophagia or dysphagia  CV: no CP, PND, orthopnea, LOC, or palpitations  Resp: no wheezing, or hemoptysis  GI: no abdominal pain, dyspepsia, nausea, vomiting, diarrhea, constipation, hematemesis, hematochezia, or melena  : no dysuria, polyuria, or hematuria  MSK: no arthralgias or joint swelling   Neuro: no gross sensory changes, numbness, focal deficits  Psych: no depression, changes in sleep, changes in concentration, or lack of energy  Heme/Onc: no purpura, petechiae or night sweats  Skin: no pruritus, hair loss or skin lesions

## 2024-09-17 NOTE — PROGRESS NOTES
[address] [facility] TEMPS 98 7  DR Peters Grain  PT INSTRUCTED TO MONITOR TEMP AND CALL PRIMARY Lila GRAY FOR INCREASE OR CONTINUED CHILLS  [de-identified] : Patient presents for LT foot CT scan results

## (undated) DEVICE — TRAVELKIT CONTAINS FIRST STEP KIT (200ML EP-4 KIT) AND SOILED SCOPE BAG - 1 KIT: Brand: TRAVELKIT CONTAINS FIRST STEP KIT AND SOILED SCOPE BAG

## (undated) DEVICE — TUBING BUBBLE CLEAR 5MM X 100 FT NS

## (undated) DEVICE — 60 ML SYRINGE,REGULAR TIP: Brand: MONOJECT

## (undated) DEVICE — SOLIDIFIER FLUID WASTE CONTROL 1500ML

## (undated) DEVICE — ANTI-FOG SOLUTION WITH FOAM PAD: Brand: DEVON

## (undated) DEVICE — RESOLUTION CLIP 2.8MM X 235CM STR LF DISP

## (undated) DEVICE — GLOVE SRG BIOGEL 8

## (undated) DEVICE — MAYO STAND COVER: Brand: CONVERTORS

## (undated) DEVICE — ADHESIVE SKN CLSR HISTOACRYL FLEX 0.5ML LF

## (undated) DEVICE — COLUMN DRAPE

## (undated) DEVICE — "MAJ-901 WATER CONTAINER SET CV-160/140": Brand: WATER CONTAINER

## (undated) DEVICE — SINGLE-USE POLYPECTOMY SNARE: Brand: SENSATION SHORT THROW

## (undated) DEVICE — RADIOLOGY STERILE LABELS: Brand: CENTURION

## (undated) DEVICE — PACK ROBOTIC PROSTATE PBDS DA VINCI SI/XI

## (undated) DEVICE — LUBRICANT INST ELECTROLUBE ANTISTK WO PAD

## (undated) DEVICE — REM POLYHESIVE ADULT PATIENT RETURN ELECTRODE: Brand: VALLEYLAB

## (undated) DEVICE — THE DISPOSABLE ROTH NET FOREIGN BODY STANDARD RETRIEVAL DEVICE IS USED IN THE ENDOSCOPIC RETRIEVAL OF FOREIGN BODY, FOOD BOLUS AND EXCISED TISSUE SUCH AS POLYPS.: Brand: ROTH NET

## (undated) DEVICE — GLOVE EXAM NON-STRL NTRL PLUS LRG PF

## (undated) DEVICE — SYRINGE 10ML LL

## (undated) DEVICE — ENDOPATH XCEL BLADELESS TROCARS WITH STABILITY SLEEVES: Brand: ENDOPATH XCEL

## (undated) DEVICE — POV-IOD SOLUTION 4OZ BT

## (undated) DEVICE — GLOVE INDICATOR PI UNDERGLOVE SZ 8.5 BLUE

## (undated) DEVICE — GLOVE INDICATOR PI UNDERGLOVE SZ 7 BLUE

## (undated) DEVICE — DISPOSABLE BIOPSY VALVE MAJ-1555: Brand: SINGLE USE BIOPSY VALVE (STERILE)

## (undated) DEVICE — CHLORHEXIDINE 4PCT 4 OZ

## (undated) DEVICE — INSUFFLATION TUBING PRIMFLO

## (undated) DEVICE — BRUSH ENDO CLEANING DBL-HEADER

## (undated) DEVICE — GLOVE SRG BIOGEL 8.5

## (undated) DEVICE — 1200CC GUARDIAN II: Brand: GUARDIAN

## (undated) DEVICE — "MH-438 A/W VLVE F/140 EVIS-140": Brand: AIR/WATER VALVE

## (undated) DEVICE — AIRLIFE™  ADULT CUSHION NASAL CANNULA WITH 7 FOOT (2.1 M) CRUSH-RESISTANT OXYGEN TUBING, AND U/CONNECT-IT ADAPTER: Brand: AIRLIFE™

## (undated) DEVICE — ENDOPATH XCEL UNIVERSAL TROCAR STABLILITY SLEEVES: Brand: ENDOPATH XCEL

## (undated) DEVICE — "MH-443 SUCTION VALVE F/EVIS140 EVIS160": Brand: SUCTION VALVE

## (undated) DEVICE — ARM DRAPE

## (undated) DEVICE — BITE BLOCK MAXI 60FR LF STRAP

## (undated) DEVICE — CHLORAPREP HI-LITE 26ML ORANGE

## (undated) DEVICE — CANNULA SEAL

## (undated) DEVICE — NEEDLE 25G X 1 1/2

## (undated) DEVICE — PREMIUM DRY TRAY LF: Brand: MEDLINE INDUSTRIES, INC.

## (undated) DEVICE — TUBING SUCTION 5MM X 12 FT

## (undated) DEVICE — SINGLE-USE BIOPSY FORCEPS: Brand: RADIAL JAW 4

## (undated) DEVICE — FABRIC REINFORCED, SURGICAL GOWN, XL: Brand: CONVERTORS

## (undated) DEVICE — 3000CC GUARDIAN II: Brand: GUARDIAN

## (undated) DEVICE — MEDI-VAC YANKAUER SUCTION HANDLE: Brand: CARDINAL HEALTH

## (undated) DEVICE — CLAMP TOWEL TUBING DISPOSABLE

## (undated) DEVICE — TIP COVER ACCESSORY

## (undated) DEVICE — ANTIBACTERIAL VIOLET BRAIDED (POLYGLACTIN 910), SYNTHETIC ABSORBABLE SUTURE: Brand: COATED VICRYL

## (undated) DEVICE — GAUZE SPONGES,16 PLY: Brand: CURITY

## (undated) DEVICE — LUBRICANT SURGILUBE TUBE 4 OZ  FLIP TOP

## (undated) DEVICE — ALL PURPOSE SPONGES,NONWOVEN, 4 PLY: Brand: CURITY

## (undated) DEVICE — TRAY FOLEY 16FR URIMETER SILICONE SURESTEP

## (undated) DEVICE — "MB-142 MOUTHPIECE": Brand: MOUTHPIECE

## (undated) DEVICE — SUT MONOCRYL 4-0 PS-2 27 IN Y426H

## (undated) DEVICE — CYSTOSCOPY PACK: Brand: CONVERTORS

## (undated) DEVICE — POUCH UR CATCHER STERILE

## (undated) DEVICE — INTENDED FOR TISSUE SEPARATION, AND OTHER PROCEDURES THAT REQUIRE A SHARP SURGICAL BLADE TO PUNCTURE OR CUT.: Brand: BARD-PARKER SAFETY BLADES SIZE 15, STERILE

## (undated) DEVICE — PACK PBDS STERILE LAP LITHOTOMY RF

## (undated) DEVICE — INTENDED FOR TISSUE SEPARATION, AND OTHER PROCEDURES THAT REQUIRE A SHARP SURGICAL BLADE TO PUNCTURE OR CUT.: Brand: BARD-PARKER SAFETY BLADES SIZE 11, STERILE

## (undated) DEVICE — 1820 FOAM BLOCK NEEDLE COUNTER: Brand: DEVON

## (undated) DEVICE — SYRINGE 10ML LL CONTROL TOP

## (undated) DEVICE — SYRINGE 50ML LL

## (undated) DEVICE — STRL COTTON TIP APPLCTR 6IN PK: Brand: CARDINAL HEALTH

## (undated) DEVICE — CYSTO TUBING TUR Y IRRIGATION

## (undated) DEVICE — TUBING AUX CHANNEL